# Patient Record
Sex: FEMALE | Race: BLACK OR AFRICAN AMERICAN | NOT HISPANIC OR LATINO | Employment: UNEMPLOYED | URBAN - METROPOLITAN AREA
[De-identification: names, ages, dates, MRNs, and addresses within clinical notes are randomized per-mention and may not be internally consistent; named-entity substitution may affect disease eponyms.]

---

## 2023-02-22 LAB
ANION GAP IN SER/PLAS: 13 MMOL/L (ref 10–20)
CALCIUM (MG/DL) IN SER/PLAS: 9.3 MG/DL (ref 8.6–10.6)
CARBON DIOXIDE, TOTAL (MMOL/L) IN SER/PLAS: 29 MMOL/L (ref 21–32)
CHLORIDE (MMOL/L) IN SER/PLAS: 101 MMOL/L (ref 98–107)
CHOLESTEROL (MG/DL) IN SER/PLAS: 181 MG/DL (ref 0–199)
CHOLESTEROL IN HDL (MG/DL) IN SER/PLAS: 37.7 MG/DL
CHOLESTEROL/HDL RATIO: 4.8
CREATININE (MG/DL) IN SER/PLAS: 0.51 MG/DL (ref 0.5–1.05)
ESTIMATED AVERAGE GLUCOSE FOR HBA1C: 126 MG/DL
GFR FEMALE: >90 ML/MIN/1.73M2
GLUCOSE (MG/DL) IN SER/PLAS: 133 MG/DL (ref 74–99)
HEMOGLOBIN A1C/HEMOGLOBIN TOTAL IN BLOOD: 6 %
LDL: 126 MG/DL (ref 0–99)
POTASSIUM (MMOL/L) IN SER/PLAS: 4 MMOL/L (ref 3.5–5.3)
SODIUM (MMOL/L) IN SER/PLAS: 139 MMOL/L (ref 136–145)
TRIGLYCERIDE (MG/DL) IN SER/PLAS: 88 MG/DL (ref 0–149)
UREA NITROGEN (MG/DL) IN SER/PLAS: 8 MG/DL (ref 6–23)
VLDL: 18 MG/DL (ref 0–40)

## 2023-02-23 LAB
ALBUMIN (MG/L) IN URINE: 110.4 MG/L
ALBUMIN/CREATININE (UG/MG) IN URINE: 101.3 UG/MG CRT (ref 0–30)
CREATININE (MG/DL) IN URINE: 109 MG/DL (ref 20–320)

## 2023-03-03 LAB
BASOPHILS (10*3/UL) IN BLOOD BY AUTOMATED COUNT: 0.02 X10E9/L (ref 0–0.1)
BASOPHILS/100 LEUKOCYTES IN BLOOD BY AUTOMATED COUNT: 0.4 % (ref 0–2)
EOSINOPHILS (10*3/UL) IN BLOOD BY AUTOMATED COUNT: 0.1 X10E9/L (ref 0–0.7)
EOSINOPHILS/100 LEUKOCYTES IN BLOOD BY AUTOMATED COUNT: 1.8 % (ref 0–6)
ERYTHROCYTE DISTRIBUTION WIDTH (RATIO) BY AUTOMATED COUNT: 12.5 % (ref 11.5–14.5)
ERYTHROCYTE MEAN CORPUSCULAR HEMOGLOBIN CONCENTRATION (G/DL) BY AUTOMATED: 32.5 G/DL (ref 32–36)
ERYTHROCYTE MEAN CORPUSCULAR VOLUME (FL) BY AUTOMATED COUNT: 84 FL (ref 80–100)
ERYTHROCYTES (10*6/UL) IN BLOOD BY AUTOMATED COUNT: 4.53 X10E12/L (ref 4–5.2)
HEMATOCRIT (%) IN BLOOD BY AUTOMATED COUNT: 37.9 % (ref 36–46)
HEMOGLOBIN (G/DL) IN BLOOD: 12.3 G/DL (ref 12–16)
IMMATURE GRANULOCYTES/100 LEUKOCYTES IN BLOOD BY AUTOMATED COUNT: 0.5 % (ref 0–0.9)
LEUKOCYTES (10*3/UL) IN BLOOD BY AUTOMATED COUNT: 5.5 X10E9/L (ref 4.4–11.3)
LYMPHOCYTES (10*3/UL) IN BLOOD BY AUTOMATED COUNT: 1.56 X10E9/L (ref 1.2–4.8)
LYMPHOCYTES/100 LEUKOCYTES IN BLOOD BY AUTOMATED COUNT: 28.3 % (ref 13–44)
MONOCYTES (10*3/UL) IN BLOOD BY AUTOMATED COUNT: 0.52 X10E9/L (ref 0.1–1)
MONOCYTES/100 LEUKOCYTES IN BLOOD BY AUTOMATED COUNT: 9.4 % (ref 2–10)
NEUTROPHILS (10*3/UL) IN BLOOD BY AUTOMATED COUNT: 3.29 X10E9/L (ref 1.2–7.7)
NEUTROPHILS/100 LEUKOCYTES IN BLOOD BY AUTOMATED COUNT: 59.6 % (ref 40–80)
NRBC (PER 100 WBCS) BY AUTOMATED COUNT: 0 /100 WBC (ref 0–0)
PLATELETS (10*3/UL) IN BLOOD AUTOMATED COUNT: 320 X10E9/L (ref 150–450)

## 2023-03-12 DIAGNOSIS — R92.8 ABNORMAL MAMMOGRAM: Primary | ICD-10-CM

## 2023-03-13 ENCOUNTER — TELEPHONE (OUTPATIENT)
Dept: PRIMARY CARE | Facility: CLINIC | Age: 63
End: 2023-03-13

## 2023-03-13 NOTE — TELEPHONE ENCOUNTER
----- Message from Katlin Calloway Pla, DO sent at 3/12/2023  4:54 PM EDT -----  Notify pt her diagnostic mammogram and US are showing probably benign masses but also maybe a hematoma.  Did she bump her breast recemtly?  They are recommending a repeat mammogram and US in 4-6 weeks.   I will put the order on her chart

## 2023-04-10 ENCOUNTER — PATIENT OUTREACH (OUTPATIENT)
Dept: CARE COORDINATION | Facility: CLINIC | Age: 63
End: 2023-04-10
Payer: MEDICARE

## 2023-04-10 RX ORDER — SULFAMETHOXAZOLE AND TRIMETHOPRIM 800; 160 MG/1; MG/1
1 TABLET ORAL 3 TIMES WEEKLY
COMMUNITY
Start: 2023-04-07 | End: 2023-06-30 | Stop reason: ALTCHOICE

## 2023-04-10 RX ORDER — PREDNISONE 10 MG/1
10 TABLET ORAL DAILY
COMMUNITY
Start: 2023-04-07 | End: 2023-06-30 | Stop reason: ALTCHOICE

## 2023-04-10 RX ORDER — PANTOPRAZOLE SODIUM 40 MG/1
1 TABLET, DELAYED RELEASE ORAL DAILY
COMMUNITY
Start: 2023-04-07 | End: 2023-06-30 | Stop reason: ALTCHOICE

## 2023-04-10 NOTE — PROGRESS NOTES
Discharge Facility: [facility]  Discharge Diagnosis: Optic Neuritis  Admission Date: 4/4/23  Discharge Date: 4/7/23    No PCP appointments available within 14 days of discharge  Message sent to practice clerical pool to reach out to patient and schedule an appointment within 7-13 days from discharge date.       Engagement  Call Start Time: 1011 (4/10/2023 10:12 AM)    Medications  Medications reviewed with patient/caregiver?: Yes (4/10/2023 10:12 AM)  Is the patient having any side effects they believe may be caused by any medication additions or changes?: (!) Yes (high blood sugars related to steroids, very poor energy levels) (4/10/2023 10:12 AM)  Does the patient have all medications ordered at discharge?: Yes (4/10/2023 10:12 AM)  Is the patient taking all medications as directed (includes completed medication regime)?: Yes (4/10/2023 10:12 AM)    Appointments  Does the patient have a primary care provider?: Yes (4/10/2023 10:12 AM)  Care Management Interventions: Educated patient on importance of making appointment; Advised patient to make appointment (4/10/2023 10:12 AM)  Care Management Interventions: Educated on importance of keeping appointment (neuroopthalmalogy and neuromuscular appts scheduled.) (4/10/2023 10:12 AM)  Follow Up Tasks: PCP needed (4/10/2023 10:12 AM)    Self Management  What is the home health agency?: N/A (4/10/2023 10:12 AM)  What Durable Medical Equipment (DME) was ordered?: N/A (4/10/2023 10:12 AM)    Patient Teaching  Does the patient have access to their discharge instructions?: Yes (4/10/2023 10:12 AM)  Care Management Interventions: Reviewed instructions with patient (4/10/2023 10:12 AM)  What is the patient's perception of their health status since discharge?: Same (patient expresses weakness/poor energy along with high blood sugars since discharge. states she was on insulin sliding scale in the hospital (related to being on steriods) and she is concerned it is  not being well controlled at home on her normal meds.) (4/10/2023 10:12 AM)  Is the patient/caregiver able to teach back the hierarchy of who to call/visit for symptoms/problems? PCP, Specialist, Home Health nurse, Urgent Care, ED, 911: Yes (4/10/2023 10:12 AM)  Patient/Caregiver Education Comments: pt was already planning to contact endocrinology MD Curtis Kim office to discuss high blood sugars. (4/10/2023 10:12 AM)

## 2023-04-17 PROBLEM — H52.4 MYOPIA OF BOTH EYES WITH ASTIGMATISM AND PRESBYOPIA: Status: ACTIVE | Noted: 2023-04-17

## 2023-04-17 PROBLEM — H25.12 AGE-RELATED NUCLEAR CATARACT, LEFT: Status: ACTIVE | Noted: 2023-04-17

## 2023-04-17 PROBLEM — R92.8 ABNORMAL MAMMOGRAM: Status: ACTIVE | Noted: 2023-04-17

## 2023-04-17 PROBLEM — E04.1 LEFT THYROID NODULE: Status: ACTIVE | Noted: 2023-04-17

## 2023-04-17 PROBLEM — H25.813 COMBINED FORM OF AGE-RELATED CATARACT, BOTH EYES: Status: ACTIVE | Noted: 2023-04-17

## 2023-04-17 PROBLEM — E11.9 TYPE 2 DIABETES MELLITUS (MULTI): Status: ACTIVE | Noted: 2023-04-17

## 2023-04-17 PROBLEM — H69.90 DYSFUNCTION OF EUSTACHIAN TUBE: Status: ACTIVE | Noted: 2023-04-17

## 2023-04-17 PROBLEM — I10 ESSENTIAL HYPERTENSION: Status: ACTIVE | Noted: 2023-04-17

## 2023-04-17 PROBLEM — H52.203 MYOPIA OF BOTH EYES WITH ASTIGMATISM AND PRESBYOPIA: Status: ACTIVE | Noted: 2023-04-17

## 2023-04-17 PROBLEM — H25.11 AGE-RELATED NUCLEAR CATARACT, RIGHT: Status: ACTIVE | Noted: 2023-04-17

## 2023-04-17 PROBLEM — F32.0 DEPRESSION, MAJOR, SINGLE EPISODE, MILD (CMS-HCC): Status: ACTIVE | Noted: 2023-04-17

## 2023-04-17 PROBLEM — H04.123 DRY EYES, BILATERAL: Status: ACTIVE | Noted: 2023-04-17

## 2023-04-17 PROBLEM — E78.5 DYSLIPIDEMIA: Status: ACTIVE | Noted: 2023-04-17

## 2023-04-17 PROBLEM — F41.9 ANXIETY: Status: ACTIVE | Noted: 2023-04-17

## 2023-04-17 PROBLEM — Z79.624 ENCOUNTER FOR LONG TERM CURRENT AZATHIOPRINE THERAPY: Status: ACTIVE | Noted: 2023-04-17

## 2023-04-17 PROBLEM — F32.A DEPRESSION: Status: ACTIVE | Noted: 2023-04-17

## 2023-04-17 PROBLEM — G62.9 NEUROPATHY: Status: ACTIVE | Noted: 2023-04-17

## 2023-04-17 PROBLEM — G43.909 MIGRAINES: Status: ACTIVE | Noted: 2023-04-17

## 2023-04-17 PROBLEM — R51.9 DAILY HEADACHE: Status: ACTIVE | Noted: 2023-04-17

## 2023-04-17 PROBLEM — G47.00 INSOMNIA: Status: ACTIVE | Noted: 2023-04-17

## 2023-04-17 PROBLEM — H52.13 MYOPIA OF BOTH EYES WITH ASTIGMATISM AND PRESBYOPIA: Status: ACTIVE | Noted: 2023-04-17

## 2023-04-17 PROBLEM — H90.3 ASYMMETRIC SNHL (SENSORINEURAL HEARING LOSS): Status: ACTIVE | Noted: 2023-04-17

## 2023-04-17 PROBLEM — H90.0 CONDUCTIVE HEARING LOSS, BILATERAL: Status: ACTIVE | Noted: 2023-04-17

## 2023-04-17 PROBLEM — G36.0 NEUROMYELITIS OPTICA (MULTI): Status: ACTIVE | Noted: 2023-04-17

## 2023-04-20 ENCOUNTER — PATIENT OUTREACH (OUTPATIENT)
Dept: CARE COORDINATION | Facility: CLINIC | Age: 63
End: 2023-04-20
Payer: MEDICARE

## 2023-04-20 NOTE — PROGRESS NOTES
Unable to reach patient for call back. Patient has not had a PCP follow up visit.  LVM with call back number for patient to call if needed.

## 2023-06-21 LAB
ALANINE AMINOTRANSFERASE (SGPT) (U/L) IN SER/PLAS: 13 U/L (ref 7–45)
ALBUMIN (G/DL) IN SER/PLAS: 4.5 G/DL (ref 3.4–5)
ALKALINE PHOSPHATASE (U/L) IN SER/PLAS: 75 U/L (ref 33–136)
ANION GAP IN SER/PLAS: 14 MMOL/L (ref 10–20)
ASPARTATE AMINOTRANSFERASE (SGOT) (U/L) IN SER/PLAS: 13 U/L (ref 9–39)
BASOPHILS (10*3/UL) IN BLOOD BY AUTOMATED COUNT: 0.03 X10E9/L (ref 0–0.1)
BASOPHILS/100 LEUKOCYTES IN BLOOD BY AUTOMATED COUNT: 0.5 % (ref 0–2)
BILIRUBIN TOTAL (MG/DL) IN SER/PLAS: 0.5 MG/DL (ref 0–1.2)
CALCIUM (MG/DL) IN SER/PLAS: 9.8 MG/DL (ref 8.6–10.6)
CARBON DIOXIDE, TOTAL (MMOL/L) IN SER/PLAS: 27 MMOL/L (ref 21–32)
CHLORIDE (MMOL/L) IN SER/PLAS: 100 MMOL/L (ref 98–107)
CREATININE (MG/DL) IN SER/PLAS: 0.62 MG/DL (ref 0.5–1.05)
EOSINOPHILS (10*3/UL) IN BLOOD BY AUTOMATED COUNT: 0.1 X10E9/L (ref 0–0.7)
EOSINOPHILS/100 LEUKOCYTES IN BLOOD BY AUTOMATED COUNT: 1.8 % (ref 0–6)
ERYTHROCYTE DISTRIBUTION WIDTH (RATIO) BY AUTOMATED COUNT: 14.1 % (ref 11.5–14.5)
ERYTHROCYTE MEAN CORPUSCULAR HEMOGLOBIN CONCENTRATION (G/DL) BY AUTOMATED: 32.1 G/DL (ref 32–36)
ERYTHROCYTE MEAN CORPUSCULAR VOLUME (FL) BY AUTOMATED COUNT: 83 FL (ref 80–100)
ERYTHROCYTES (10*6/UL) IN BLOOD BY AUTOMATED COUNT: 4.67 X10E12/L (ref 4–5.2)
GFR FEMALE: >90 ML/MIN/1.73M2
GLUCOSE (MG/DL) IN SER/PLAS: 105 MG/DL (ref 74–99)
HEMATOCRIT (%) IN BLOOD BY AUTOMATED COUNT: 38.9 % (ref 36–46)
HEMOGLOBIN (G/DL) IN BLOOD: 12.5 G/DL (ref 12–16)
IMMATURE GRANULOCYTES/100 LEUKOCYTES IN BLOOD BY AUTOMATED COUNT: 0.7 % (ref 0–0.9)
LEUKOCYTES (10*3/UL) IN BLOOD BY AUTOMATED COUNT: 5.7 X10E9/L (ref 4.4–11.3)
LYMPHOCYTES (10*3/UL) IN BLOOD BY AUTOMATED COUNT: 1.39 X10E9/L (ref 1.2–4.8)
LYMPHOCYTES/100 LEUKOCYTES IN BLOOD BY AUTOMATED COUNT: 24.3 % (ref 13–44)
MONOCYTES (10*3/UL) IN BLOOD BY AUTOMATED COUNT: 0.45 X10E9/L (ref 0.1–1)
MONOCYTES/100 LEUKOCYTES IN BLOOD BY AUTOMATED COUNT: 7.9 % (ref 2–10)
NEUTROPHILS (10*3/UL) IN BLOOD BY AUTOMATED COUNT: 3.7 X10E9/L (ref 1.2–7.7)
NEUTROPHILS/100 LEUKOCYTES IN BLOOD BY AUTOMATED COUNT: 64.8 % (ref 40–80)
NRBC (PER 100 WBCS) BY AUTOMATED COUNT: 0 /100 WBC (ref 0–0)
PLATELETS (10*3/UL) IN BLOOD AUTOMATED COUNT: 312 X10E9/L (ref 150–450)
POTASSIUM (MMOL/L) IN SER/PLAS: 3.9 MMOL/L (ref 3.5–5.3)
PROTEIN TOTAL: 7 G/DL (ref 6.4–8.2)
SODIUM (MMOL/L) IN SER/PLAS: 137 MMOL/L (ref 136–145)
UREA NITROGEN (MG/DL) IN SER/PLAS: 8 MG/DL (ref 6–23)

## 2023-06-26 ENCOUNTER — TELEPHONE (OUTPATIENT)
Dept: PRIMARY CARE | Facility: CLINIC | Age: 63
End: 2023-06-26
Payer: MEDICARE

## 2023-06-26 DIAGNOSIS — G62.9 NEUROPATHY: Primary | ICD-10-CM

## 2023-06-27 LAB — MOG FACS, S: NEGATIVE

## 2023-06-28 DIAGNOSIS — F32.A DEPRESSION, UNSPECIFIED: ICD-10-CM

## 2023-06-28 DIAGNOSIS — F41.9 ANXIETY DISORDER, UNSPECIFIED: ICD-10-CM

## 2023-06-28 RX ORDER — BUPROPION HYDROCHLORIDE 75 MG/1
TABLET ORAL
Qty: 90 TABLET | Refills: 0 | Status: SHIPPED | OUTPATIENT
Start: 2023-06-28 | End: 2023-06-30 | Stop reason: SDUPTHER

## 2023-06-28 RX ORDER — ESCITALOPRAM OXALATE 20 MG/1
TABLET ORAL
Qty: 90 TABLET | Refills: 0 | Status: SHIPPED | OUTPATIENT
Start: 2023-06-28 | End: 2024-01-12 | Stop reason: SDUPTHER

## 2023-06-29 ENCOUNTER — LAB (OUTPATIENT)
Dept: LAB | Facility: LAB | Age: 63
End: 2023-06-29
Payer: MEDICARE

## 2023-06-29 DIAGNOSIS — G62.9 NEUROPATHY: ICD-10-CM

## 2023-06-29 LAB
CALCIDIOL (25 OH VITAMIN D3) (NG/ML) IN SER/PLAS: 48 NG/ML
COBALAMIN (VITAMIN B12) (PG/ML) IN SER/PLAS: 403 PG/ML (ref 211–911)

## 2023-06-29 PROCEDURE — 82607 VITAMIN B-12: CPT

## 2023-06-29 PROCEDURE — 82306 VITAMIN D 25 HYDROXY: CPT

## 2023-06-29 PROCEDURE — 36415 COLL VENOUS BLD VENIPUNCTURE: CPT

## 2023-06-30 ENCOUNTER — OFFICE VISIT (OUTPATIENT)
Dept: PRIMARY CARE | Facility: CLINIC | Age: 63
End: 2023-06-30
Payer: MEDICARE

## 2023-06-30 VITALS
DIASTOLIC BLOOD PRESSURE: 84 MMHG | OXYGEN SATURATION: 96 % | RESPIRATION RATE: 16 BRPM | HEART RATE: 74 BPM | WEIGHT: 188.2 LBS | BODY MASS INDEX: 31.32 KG/M2 | TEMPERATURE: 98.4 F | SYSTOLIC BLOOD PRESSURE: 130 MMHG

## 2023-06-30 DIAGNOSIS — I10 ESSENTIAL HYPERTENSION: Primary | ICD-10-CM

## 2023-06-30 DIAGNOSIS — E78.5 DYSLIPIDEMIA: ICD-10-CM

## 2023-06-30 DIAGNOSIS — F41.9 ANXIETY: ICD-10-CM

## 2023-06-30 DIAGNOSIS — F32.0 DEPRESSION, MAJOR, SINGLE EPISODE, MILD (CMS-HCC): ICD-10-CM

## 2023-06-30 DIAGNOSIS — E66.09 CLASS 1 OBESITY DUE TO EXCESS CALORIES WITH SERIOUS COMORBIDITY AND BODY MASS INDEX (BMI) OF 31.0 TO 31.9 IN ADULT: ICD-10-CM

## 2023-06-30 DIAGNOSIS — G43.809 OTHER MIGRAINE WITHOUT STATUS MIGRAINOSUS, NOT INTRACTABLE: ICD-10-CM

## 2023-06-30 DIAGNOSIS — G36.0 NEUROMYELITIS OPTICA (MULTI): ICD-10-CM

## 2023-06-30 DIAGNOSIS — F41.9 ANXIETY DISORDER, UNSPECIFIED: ICD-10-CM

## 2023-06-30 DIAGNOSIS — E11.69 TYPE 2 DIABETES MELLITUS WITH OTHER SPECIFIED COMPLICATION, WITHOUT LONG-TERM CURRENT USE OF INSULIN (MULTI): ICD-10-CM

## 2023-06-30 PROBLEM — H69.90 DYSFUNCTION OF EUSTACHIAN TUBE: Status: RESOLVED | Noted: 2023-04-17 | Resolved: 2023-06-30

## 2023-06-30 PROBLEM — F32.A DEPRESSION: Status: RESOLVED | Noted: 2023-04-17 | Resolved: 2023-06-30

## 2023-06-30 PROBLEM — R51.9 DAILY HEADACHE: Status: RESOLVED | Noted: 2023-04-17 | Resolved: 2023-06-30

## 2023-06-30 PROBLEM — R92.8 ABNORMAL MAMMOGRAM: Status: RESOLVED | Noted: 2023-04-17 | Resolved: 2023-06-30

## 2023-06-30 PROBLEM — G62.9 NEUROPATHY: Status: RESOLVED | Noted: 2023-04-17 | Resolved: 2023-06-30

## 2023-06-30 PROBLEM — E66.811 CLASS 1 OBESITY DUE TO EXCESS CALORIES WITH SERIOUS COMORBIDITY AND BODY MASS INDEX (BMI) OF 31.0 TO 31.9 IN ADULT: Status: ACTIVE | Noted: 2023-06-30

## 2023-06-30 PROBLEM — H04.123 DRY EYES, BILATERAL: Status: RESOLVED | Noted: 2023-04-17 | Resolved: 2023-06-30

## 2023-06-30 LAB
Lab: ABNORMAL TITER
NMO/AQP4 FACS, S: POSITIVE

## 2023-06-30 PROCEDURE — 1036F TOBACCO NON-USER: CPT | Performed by: FAMILY MEDICINE

## 2023-06-30 PROCEDURE — 99214 OFFICE O/P EST MOD 30 MIN: CPT | Performed by: FAMILY MEDICINE

## 2023-06-30 PROCEDURE — 3079F DIAST BP 80-89 MM HG: CPT | Performed by: FAMILY MEDICINE

## 2023-06-30 PROCEDURE — 3075F SYST BP GE 130 - 139MM HG: CPT | Performed by: FAMILY MEDICINE

## 2023-06-30 PROCEDURE — 3044F HG A1C LEVEL LT 7.0%: CPT | Performed by: FAMILY MEDICINE

## 2023-06-30 PROCEDURE — 3008F BODY MASS INDEX DOCD: CPT | Performed by: FAMILY MEDICINE

## 2023-06-30 RX ORDER — BUPROPION HYDROCHLORIDE 75 MG/1
75 TABLET ORAL DAILY
Qty: 90 TABLET | Refills: 0 | Status: SHIPPED | OUTPATIENT
Start: 2023-06-30 | End: 2023-09-26

## 2023-06-30 RX ORDER — HYDROCHLOROTHIAZIDE 25 MG/1
25 TABLET ORAL DAILY
Qty: 90 TABLET | Refills: 1 | Status: SHIPPED | OUTPATIENT
Start: 2023-06-30 | End: 2024-01-01

## 2023-06-30 ASSESSMENT — PATIENT HEALTH QUESTIONNAIRE - PHQ9
1. LITTLE INTEREST OR PLEASURE IN DOING THINGS: NOT AT ALL
2. FEELING DOWN, DEPRESSED OR HOPELESS: NOT AT ALL
SUM OF ALL RESPONSES TO PHQ9 QUESTIONS 1 AND 2: 0

## 2023-06-30 NOTE — PROGRESS NOTES
Subjective   Patient ID: Celeste Neely is a 62 y.o. female who presents for Med Refill.    Here for med check and refills.     Taking meds daily for HTN, anxiety and DM.  Was hospitalized for a flare of optic neuritis on the left  Seeing neuro-ophthalmology  MRI positive for lesion on the optic nerve  High dose steroids caused high sugars  Started on Mounjaro per endo  Doing great on the meds  Suppressing her appetite    Weight down 5 lbs  Has been on meds for the past 2 months  Stopped all other Dm meds    Seeing endo regular for DM  A1C level was bad at 7.8  Blood sugars are getting better.     Denies chest pain, shortness of breath, lightheaded, dizziness or headaches.     Med Refill       Review of Systems    Objective   /84   Pulse 74   Temp 36.9 °C (98.4 °F)   Resp 16   Wt 85.4 kg (188 lb 3.2 oz)   SpO2 96%   BMI 31.32 kg/m²     Physical Exam  Vitals and nursing note reviewed.   Constitutional:       Appearance: Normal appearance.   Cardiovascular:      Rate and Rhythm: Normal rate and regular rhythm.   Pulmonary:      Effort: Pulmonary effort is normal.      Breath sounds: Normal breath sounds.   Musculoskeletal:      Cervical back: Normal range of motion.   Neurological:      Mental Status: She is alert.   Psychiatric:         Mood and Affect: Mood normal.         Behavior: Behavior normal.         Thought Content: Thought content normal.         Judgment: Judgment normal.       Assessment/Plan   Problem List Items Addressed This Visit       Anxiety     Improving with additional bupropion.  Refilling meds at same dose.  Recheck in 3 months         Relevant Orders    TSH with reflex to Free T4 if abnormal    Depression, major, single episode, mild (CMS/HCC)     Improving with additional meds.  Refilling at same dose.  Recheck in 3 months         Dyslipidemia     Checking fasting labs and treat accordingly         Relevant Orders    Comprehensive Metabolic Panel    Lipid Panel    Essential  hypertension - Primary     Stable  Refilling meds at same dose.  Reviewed previous labs.  Congratulations on weight loss efforts.  Recheck in 3 months         Relevant Medications    hydroCHLOROthiazide (HYDRODiuril) 25 mg tablet    Other Relevant Orders    CBC and Auto Differential    Migraines    Neuromyelitis optica (CMS/HCC)     Stable  Continue care per neuro and neuro ophthalmology         Type 2 diabetes mellitus (CMS/[facility])     Improving  Continue care per Endo         Relevant Orders    Hemoglobin A1C    Class 1 obesity due to excess calories with serious comorbidity and body mass index (BMI) of 31.0 to 31.9 in adult     Improving with Mounjaro  Continue present meds.  Recheck in 3 months          Other Visit Diagnoses       Anxiety disorder, unspecified        Relevant Medications    buPROPion (Wellbutrin) 75 mg tablet

## 2023-06-30 NOTE — ASSESSMENT & PLAN NOTE
Stable  Refilling meds at same dose.  Reviewed previous labs.  Congratulations on weight loss efforts.  Recheck in 3 months

## 2023-08-16 LAB
ALANINE AMINOTRANSFERASE (SGPT) (U/L) IN SER/PLAS: 16 U/L (ref 7–45)
ALBUMIN (G/DL) IN SER/PLAS: 4.4 G/DL (ref 3.4–5)
ALKALINE PHOSPHATASE (U/L) IN SER/PLAS: 73 U/L (ref 33–136)
ANION GAP IN SER/PLAS: 15 MMOL/L (ref 10–20)
ASPARTATE AMINOTRANSFERASE (SGOT) (U/L) IN SER/PLAS: 16 U/L (ref 9–39)
BASOPHILS (10*3/UL) IN BLOOD BY AUTOMATED COUNT: 0.02 X10E9/L (ref 0–0.1)
BASOPHILS/100 LEUKOCYTES IN BLOOD BY AUTOMATED COUNT: 0.3 % (ref 0–2)
BILIRUBIN TOTAL (MG/DL) IN SER/PLAS: 0.6 MG/DL (ref 0–1.2)
CALCIUM (MG/DL) IN SER/PLAS: 9.6 MG/DL (ref 8.6–10.6)
CARBON DIOXIDE, TOTAL (MMOL/L) IN SER/PLAS: 29 MMOL/L (ref 21–32)
CHLORIDE (MMOL/L) IN SER/PLAS: 99 MMOL/L (ref 98–107)
CREATININE (MG/DL) IN SER/PLAS: 0.5 MG/DL (ref 0.5–1.05)
EOSINOPHILS (10*3/UL) IN BLOOD BY AUTOMATED COUNT: 0.14 X10E9/L (ref 0–0.7)
EOSINOPHILS/100 LEUKOCYTES IN BLOOD BY AUTOMATED COUNT: 2.4 % (ref 0–6)
ERYTHROCYTE DISTRIBUTION WIDTH (RATIO) BY AUTOMATED COUNT: 12.4 % (ref 11.5–14.5)
ERYTHROCYTE MEAN CORPUSCULAR HEMOGLOBIN CONCENTRATION (G/DL) BY AUTOMATED: 31.1 G/DL (ref 32–36)
ERYTHROCYTE MEAN CORPUSCULAR VOLUME (FL) BY AUTOMATED COUNT: 86 FL (ref 80–100)
ERYTHROCYTES (10*6/UL) IN BLOOD BY AUTOMATED COUNT: 4.77 X10E12/L (ref 4–5.2)
GFR FEMALE: >90 ML/MIN/1.73M2
GLUCOSE (MG/DL) IN SER/PLAS: 140 MG/DL (ref 74–99)
HEMATOCRIT (%) IN BLOOD BY AUTOMATED COUNT: 40.9 % (ref 36–46)
HEMOGLOBIN (G/DL) IN BLOOD: 12.7 G/DL (ref 12–16)
IMMATURE GRANULOCYTES/100 LEUKOCYTES IN BLOOD BY AUTOMATED COUNT: 0.3 % (ref 0–0.9)
LEUKOCYTES (10*3/UL) IN BLOOD BY AUTOMATED COUNT: 5.8 X10E9/L (ref 4.4–11.3)
LYMPHOCYTES (10*3/UL) IN BLOOD BY AUTOMATED COUNT: 1.37 X10E9/L (ref 1.2–4.8)
LYMPHOCYTES/100 LEUKOCYTES IN BLOOD BY AUTOMATED COUNT: 23.8 % (ref 13–44)
MONOCYTES (10*3/UL) IN BLOOD BY AUTOMATED COUNT: 0.43 X10E9/L (ref 0.1–1)
MONOCYTES/100 LEUKOCYTES IN BLOOD BY AUTOMATED COUNT: 7.5 % (ref 2–10)
NEUTROPHILS (10*3/UL) IN BLOOD BY AUTOMATED COUNT: 3.77 X10E9/L (ref 1.2–7.7)
NEUTROPHILS/100 LEUKOCYTES IN BLOOD BY AUTOMATED COUNT: 65.7 % (ref 40–80)
NRBC (PER 100 WBCS) BY AUTOMATED COUNT: 0 /100 WBC (ref 0–0)
PLATELETS (10*3/UL) IN BLOOD AUTOMATED COUNT: 395 X10E9/L (ref 150–450)
POTASSIUM (MMOL/L) IN SER/PLAS: 3.8 MMOL/L (ref 3.5–5.3)
PROTEIN TOTAL: 6.8 G/DL (ref 6.4–8.2)
SODIUM (MMOL/L) IN SER/PLAS: 139 MMOL/L (ref 136–145)
UREA NITROGEN (MG/DL) IN SER/PLAS: 6 MG/DL (ref 6–23)

## 2023-08-29 ENCOUNTER — TELEPHONE (OUTPATIENT)
Dept: PRIMARY CARE | Facility: CLINIC | Age: 63
End: 2023-08-29
Payer: MEDICARE

## 2023-08-29 DIAGNOSIS — M79.644 CHRONIC THUMB PAIN, BILATERAL: Primary | ICD-10-CM

## 2023-08-29 DIAGNOSIS — G89.29 CHRONIC THUMB PAIN, BILATERAL: Primary | ICD-10-CM

## 2023-08-29 DIAGNOSIS — M79.645 CHRONIC THUMB PAIN, BILATERAL: Primary | ICD-10-CM

## 2023-08-29 NOTE — TELEPHONE ENCOUNTER
Patient requesting OT for bilateral thumb pain(dry needling and kinesiotaping). States went to [facility] for cortisone injection but pt states it did not help. Please advise    [facility]- [phone]

## 2023-09-25 ENCOUNTER — OFFICE VISIT (OUTPATIENT)
Dept: PRIMARY CARE | Facility: CLINIC | Age: 63
End: 2023-09-25
Payer: MEDICARE

## 2023-09-25 VITALS
RESPIRATION RATE: 16 BRPM | BODY MASS INDEX: 29.59 KG/M2 | WEIGHT: 177.8 LBS | SYSTOLIC BLOOD PRESSURE: 130 MMHG | DIASTOLIC BLOOD PRESSURE: 76 MMHG | OXYGEN SATURATION: 98 % | HEART RATE: 63 BPM | TEMPERATURE: 97.8 F

## 2023-09-25 DIAGNOSIS — E11.69 TYPE 2 DIABETES MELLITUS WITH OTHER SPECIFIED COMPLICATION, WITHOUT LONG-TERM CURRENT USE OF INSULIN (MULTI): ICD-10-CM

## 2023-09-25 DIAGNOSIS — I10 ESSENTIAL HYPERTENSION: Primary | ICD-10-CM

## 2023-09-25 DIAGNOSIS — Z12.11 COLON CANCER SCREENING: ICD-10-CM

## 2023-09-25 DIAGNOSIS — F32.0 DEPRESSION, MAJOR, SINGLE EPISODE, MILD (CMS-HCC): ICD-10-CM

## 2023-09-25 DIAGNOSIS — E78.5 DYSLIPIDEMIA: ICD-10-CM

## 2023-09-25 DIAGNOSIS — G36.0 NEUROMYELITIS OPTICA (MULTI): ICD-10-CM

## 2023-09-25 DIAGNOSIS — E66.09 CLASS 1 OBESITY DUE TO EXCESS CALORIES WITH SERIOUS COMORBIDITY AND BODY MASS INDEX (BMI) OF 31.0 TO 31.9 IN ADULT: ICD-10-CM

## 2023-09-25 PROCEDURE — 1036F TOBACCO NON-USER: CPT | Performed by: FAMILY MEDICINE

## 2023-09-25 PROCEDURE — 99213 OFFICE O/P EST LOW 20 MIN: CPT | Performed by: FAMILY MEDICINE

## 2023-09-25 PROCEDURE — 3008F BODY MASS INDEX DOCD: CPT | Performed by: FAMILY MEDICINE

## 2023-09-25 PROCEDURE — 3075F SYST BP GE 130 - 139MM HG: CPT | Performed by: FAMILY MEDICINE

## 2023-09-25 PROCEDURE — 3078F DIAST BP <80 MM HG: CPT | Performed by: FAMILY MEDICINE

## 2023-09-25 PROCEDURE — 3044F HG A1C LEVEL LT 7.0%: CPT | Performed by: FAMILY MEDICINE

## 2023-09-25 RX ORDER — AMLODIPINE BESYLATE 10 MG/1
10 TABLET ORAL DAILY
Qty: 90 TABLET | Refills: 1 | Status: SHIPPED | OUTPATIENT
Start: 2023-09-25 | End: 2024-01-12 | Stop reason: SDUPTHER

## 2023-09-25 NOTE — PROGRESS NOTES
Subjective   Patient ID: Celeste Neely is a 63 y.o. female who presents for Follow-up (3 mth med/ BP check).    Here for med check and refills    Taking meds daily for hypertension  Anxiety/depression and neuromyelitis optica    Weight down about 10 lbs from last visit.   Weight down about 16 lbs    Taking mounjaro for weight and DM  Could not tolerate the 5mg dose  Still taking 2.5 mg  Appetite still suppressed  Has to force her to eat.    Did see derm for hair loss  Taking rogaine for the past 2 weeks.   Taking pre- vits, 1/2 tab daily  Increased protein in diet.   Vegetarian eating fish only, no land animals.   Cholesterol issues and not wanting to take statins.  Denies chest pain, shortness of breath, lightheaded, dizziness or headaches.     Home BP checks are better  Has not been taking her hydrochlorothiazide  Seeing endo next month             Review of Systems    Objective   /76   Pulse 63   Temp 36.6 °C (97.8 °F)   Resp 16   Wt 80.6 kg (177 lb 12.8 oz)   SpO2 98%   BMI 29.59 kg/m²     Physical Exam  Vitals and nursing note reviewed.   Constitutional:       Appearance: Normal appearance.   Cardiovascular:      Rate and Rhythm: Normal rate and regular rhythm.   Pulmonary:      Effort: Pulmonary effort is normal.      Breath sounds: Normal breath sounds.   Musculoskeletal:      Cervical back: Normal range of motion.   Neurological:      Mental Status: She is alert.   Psychiatric:         Mood and Affect: Mood normal.         Behavior: Behavior normal.         Thought Content: Thought content normal.         Judgment: Judgment normal.       Assessment/Plan   Problem List Items Addressed This Visit       Depression, major, single episode, mild (CMS/HCC)     Stable  Continue present meds         Dyslipidemia    Essential hypertension - Primary     Stable  Reviewed all labs.  Refilling meds at same dose.  Recheck in 3 months         Relevant Medications    amLODIPine (Norvasc) 10 mg tablet     Neuromyelitis optica (CMS/HCC)     Stable  Continue care per specialist         Type 2 diabetes mellitus (CMS/HCC)     Improving with GLP-1.  Continue care per Endo         Class 1 obesity due to excess calories with serious comorbidity and body mass index (BMI) of 31.0 to 31.9 in adult     Improving with use of GLP-1.  Continue meds at same dose.  Recheck in 3 months          Other Visit Diagnoses       Colon cancer screening        Relevant Orders    Colonoscopy Screening

## 2023-09-26 DIAGNOSIS — F41.9 ANXIETY DISORDER, UNSPECIFIED: ICD-10-CM

## 2023-09-26 RX ORDER — BUPROPION HYDROCHLORIDE 75 MG/1
75 TABLET ORAL DAILY
Qty: 90 TABLET | Refills: 1 | Status: SHIPPED | OUTPATIENT
Start: 2023-09-26 | End: 2024-01-12 | Stop reason: SDUPTHER

## 2023-09-26 NOTE — TELEPHONE ENCOUNTER
Last seen yesterday   Requested Prescriptions     Pending Prescriptions Disp Refills    buPROPion (Wellbutrin) 75 mg tablet [Pharmacy Med Name: BUPROPION HCL 75 MG TABLET] 90 tablet 0     Sig: TAKE 1 TABLET BY MOUTH ONCE DAILY.

## 2023-10-02 ENCOUNTER — TELEPHONE (OUTPATIENT)
Dept: PRIMARY CARE | Facility: CLINIC | Age: 63
End: 2023-10-02
Payer: MEDICARE

## 2023-10-02 DIAGNOSIS — R92.8 ABNORMAL MAMMOGRAM OF RIGHT BREAST: Primary | ICD-10-CM

## 2023-10-04 DIAGNOSIS — I10 ESSENTIAL HYPERTENSION: ICD-10-CM

## 2023-10-05 ENCOUNTER — TELEPHONE (OUTPATIENT)
Dept: NEUROLOGY | Facility: HOSPITAL | Age: 63
End: 2023-10-05

## 2023-10-05 RX ORDER — AMLODIPINE BESYLATE 10 MG/1
10 TABLET ORAL DAILY
Qty: 90 TABLET | Refills: 1 | OUTPATIENT
Start: 2023-10-05

## 2023-10-05 NOTE — TELEPHONE ENCOUNTER
Gina from [facility] called needing some questions asked about the pt's meds.  Please [phone] reference # [id number]

## 2023-10-05 NOTE — TELEPHONE ENCOUNTER
Gina from [facility] called needing some questions asked about the pt's meds.  Please [phone] reference # [id number]

## 2023-10-11 DIAGNOSIS — G36.0 NEUROMYELITIS OPTICA (MULTI): ICD-10-CM

## 2023-10-12 RX ORDER — AZATHIOPRINE 50 MG/1
200 TABLET ORAL DAILY
Qty: 360 TABLET | Refills: 3 | Status: SHIPPED | OUTPATIENT
Start: 2023-10-12 | End: 2024-10-11

## 2023-10-26 ENCOUNTER — LAB (OUTPATIENT)
Dept: LAB | Facility: LAB | Age: 63
End: 2023-10-26
Payer: MEDICARE

## 2023-10-26 DIAGNOSIS — E11.69 TYPE 2 DIABETES MELLITUS WITH OTHER SPECIFIED COMPLICATION, WITHOUT LONG-TERM CURRENT USE OF INSULIN (MULTI): ICD-10-CM

## 2023-10-26 DIAGNOSIS — E78.5 DYSLIPIDEMIA: ICD-10-CM

## 2023-10-26 DIAGNOSIS — F41.9 ANXIETY: ICD-10-CM

## 2023-10-26 DIAGNOSIS — I10 ESSENTIAL HYPERTENSION: ICD-10-CM

## 2023-10-26 LAB
ALBUMIN SERPL BCP-MCNC: 4.3 G/DL (ref 3.4–5)
ALP SERPL-CCNC: 88 U/L (ref 33–136)
ALT SERPL W P-5'-P-CCNC: 38 U/L (ref 7–45)
ANION GAP SERPL CALC-SCNC: 13 MMOL/L (ref 10–20)
AST SERPL W P-5'-P-CCNC: 26 U/L (ref 9–39)
BASOPHILS # BLD AUTO: 0.03 X10*3/UL (ref 0–0.1)
BASOPHILS NFR BLD AUTO: 0.4 %
BILIRUB SERPL-MCNC: 0.8 MG/DL (ref 0–1.2)
BUN SERPL-MCNC: 7 MG/DL (ref 6–23)
CALCIUM SERPL-MCNC: 9.5 MG/DL (ref 8.6–10.6)
CHLORIDE SERPL-SCNC: 102 MMOL/L (ref 98–107)
CHOLEST SERPL-MCNC: 148 MG/DL (ref 0–199)
CHOLESTEROL/HDL RATIO: 3.7
CO2 SERPL-SCNC: 28 MMOL/L (ref 21–32)
CREAT SERPL-MCNC: 0.51 MG/DL (ref 0.5–1.05)
EOSINOPHIL # BLD AUTO: 0.11 X10*3/UL (ref 0–0.7)
EOSINOPHIL NFR BLD AUTO: 1.5 %
ERYTHROCYTE [DISTWIDTH] IN BLOOD BY AUTOMATED COUNT: 13.7 % (ref 11.5–14.5)
EST. AVERAGE GLUCOSE BLD GHB EST-MCNC: 128 MG/DL
GFR SERPL CREATININE-BSD FRML MDRD: >90 ML/MIN/1.73M*2
GLUCOSE SERPL-MCNC: 113 MG/DL (ref 74–99)
HBA1C MFR BLD: 6.1 %
HCT VFR BLD AUTO: 41.1 % (ref 36–46)
HDLC SERPL-MCNC: 39.9 MG/DL
HGB BLD-MCNC: 12.9 G/DL (ref 12–16)
IMM GRANULOCYTES # BLD AUTO: 0.02 X10*3/UL (ref 0–0.7)
IMM GRANULOCYTES NFR BLD AUTO: 0.3 % (ref 0–0.9)
LDLC SERPL CALC-MCNC: 95 MG/DL
LYMPHOCYTES # BLD AUTO: 1.25 X10*3/UL (ref 1.2–4.8)
LYMPHOCYTES NFR BLD AUTO: 17.5 %
MCH RBC QN AUTO: 26.7 PG (ref 26–34)
MCHC RBC AUTO-ENTMCNC: 31.4 G/DL (ref 32–36)
MCV RBC AUTO: 85 FL (ref 80–100)
MONOCYTES # BLD AUTO: 0.45 X10*3/UL (ref 0.1–1)
MONOCYTES NFR BLD AUTO: 6.3 %
NEUTROPHILS # BLD AUTO: 5.3 X10*3/UL (ref 1.2–7.7)
NEUTROPHILS NFR BLD AUTO: 74 %
NON HDL CHOLESTEROL: 108 MG/DL (ref 0–149)
NRBC BLD-RTO: 0 /100 WBCS (ref 0–0)
PLATELET # BLD AUTO: 332 X10*3/UL (ref 150–450)
PMV BLD AUTO: 11 FL (ref 7.5–11.5)
POTASSIUM SERPL-SCNC: 4.2 MMOL/L (ref 3.5–5.3)
PROT SERPL-MCNC: 6.8 G/DL (ref 6.4–8.2)
RBC # BLD AUTO: 4.83 X10*6/UL (ref 4–5.2)
SODIUM SERPL-SCNC: 139 MMOL/L (ref 136–145)
TRIGL SERPL-MCNC: 64 MG/DL (ref 0–149)
TSH SERPL-ACNC: 1.93 MIU/L (ref 0.44–3.98)
VLDL: 13 MG/DL (ref 0–40)
WBC # BLD AUTO: 7.2 X10*3/UL (ref 4.4–11.3)

## 2023-10-26 PROCEDURE — 84443 ASSAY THYROID STIM HORMONE: CPT

## 2023-10-26 PROCEDURE — 80053 COMPREHEN METABOLIC PANEL: CPT

## 2023-10-26 PROCEDURE — 80061 LIPID PANEL: CPT

## 2023-10-26 PROCEDURE — 36415 COLL VENOUS BLD VENIPUNCTURE: CPT

## 2023-10-26 PROCEDURE — 85025 COMPLETE CBC W/AUTO DIFF WBC: CPT

## 2023-10-26 PROCEDURE — 83036 HEMOGLOBIN GLYCOSYLATED A1C: CPT

## 2023-11-16 ENCOUNTER — ANCILLARY PROCEDURE (OUTPATIENT)
Dept: RADIOLOGY | Facility: CLINIC | Age: 63
End: 2023-11-16
Payer: MEDICARE

## 2023-11-16 VITALS — WEIGHT: 177.69 LBS | HEIGHT: 65 IN | BODY MASS INDEX: 29.61 KG/M2

## 2023-11-16 DIAGNOSIS — R92.8 ABNORMAL MAMMOGRAM OF RIGHT BREAST: ICD-10-CM

## 2023-11-16 PROCEDURE — 77065 DX MAMMO INCL CAD UNI: CPT | Mod: RT

## 2023-11-16 PROCEDURE — G0279 TOMOSYNTHESIS, MAMMO: HCPCS | Mod: RIGHT SIDE | Performed by: RADIOLOGY

## 2023-11-16 PROCEDURE — 77065 DX MAMMO INCL CAD UNI: CPT | Mod: RIGHT SIDE | Performed by: RADIOLOGY

## 2023-11-16 PROCEDURE — 76642 ULTRASOUND BREAST LIMITED: CPT | Mod: RIGHT SIDE | Performed by: RADIOLOGY

## 2023-11-16 PROCEDURE — 76642 ULTRASOUND BREAST LIMITED: CPT | Mod: RT

## 2023-11-22 DIAGNOSIS — Z12.11 COLON CANCER SCREENING: ICD-10-CM

## 2023-11-22 RX ORDER — POLYETHYLENE GLYCOL 3350, SODIUM SULFATE ANHYDROUS, SODIUM BICARBONATE, SODIUM CHLORIDE, POTASSIUM CHLORIDE 236; 22.74; 6.74; 5.86; 2.97 G/4L; G/4L; G/4L; G/4L; G/4L
4000 POWDER, FOR SOLUTION ORAL ONCE
Qty: 4000 ML | Refills: 0 | Status: SHIPPED | OUTPATIENT
Start: 2023-11-22 | End: 2023-11-22

## 2023-12-06 ENCOUNTER — APPOINTMENT (OUTPATIENT)
Dept: GASTROENTEROLOGY | Facility: EXTERNAL LOCATION | Age: 63
End: 2023-12-06
Payer: MEDICARE

## 2023-12-20 ENCOUNTER — APPOINTMENT (OUTPATIENT)
Dept: PRIMARY CARE | Facility: CLINIC | Age: 63
End: 2023-12-20
Payer: MEDICARE

## 2023-12-23 DIAGNOSIS — E11.9 TYPE 2 DIABETES MELLITUS WITHOUT COMPLICATIONS (MULTI): ICD-10-CM

## 2023-12-26 RX ORDER — TIRZEPATIDE 7.5 MG/.5ML
7.5 INJECTION, SOLUTION SUBCUTANEOUS
Qty: 2 ML | Refills: 0 | Status: SHIPPED | OUTPATIENT
Start: 2023-12-26 | End: 2024-01-08

## 2023-12-26 RX ORDER — TIRZEPATIDE 5 MG/.5ML
5 INJECTION, SOLUTION SUBCUTANEOUS
Qty: 2 ML | Refills: 0 | Status: SHIPPED | OUTPATIENT
Start: 2023-12-26 | End: 2024-01-08

## 2023-12-29 DIAGNOSIS — I10 ESSENTIAL HYPERTENSION: ICD-10-CM

## 2024-01-01 RX ORDER — HYDROCHLOROTHIAZIDE 25 MG/1
25 TABLET ORAL DAILY
Qty: 90 TABLET | Refills: 0 | Status: SHIPPED | OUTPATIENT
Start: 2024-01-01 | End: 2024-01-12 | Stop reason: SDUPTHER

## 2024-01-02 ENCOUNTER — TELEPHONE (OUTPATIENT)
Dept: PRIMARY CARE | Facility: CLINIC | Age: 64
End: 2024-01-02

## 2024-01-02 DIAGNOSIS — E11.69 TYPE 2 DIABETES MELLITUS WITH OTHER SPECIFIED COMPLICATION, WITHOUT LONG-TERM CURRENT USE OF INSULIN (MULTI): ICD-10-CM

## 2024-01-02 DIAGNOSIS — I10 ESSENTIAL HYPERTENSION: Primary | ICD-10-CM

## 2024-01-03 ENCOUNTER — LAB (OUTPATIENT)
Dept: LAB | Facility: LAB | Age: 64
End: 2024-01-03
Payer: MEDICARE

## 2024-01-03 ENCOUNTER — TELEPHONE (OUTPATIENT)
Dept: PRIMARY CARE | Facility: CLINIC | Age: 64
End: 2024-01-03

## 2024-01-03 DIAGNOSIS — I10 ESSENTIAL HYPERTENSION: Primary | ICD-10-CM

## 2024-01-03 DIAGNOSIS — E11.9 TYPE 2 DIABETES MELLITUS WITHOUT COMPLICATIONS (MULTI): Primary | ICD-10-CM

## 2024-01-03 DIAGNOSIS — E11.69 TYPE 2 DIABETES MELLITUS WITH OTHER SPECIFIED COMPLICATION, WITHOUT LONG-TERM CURRENT USE OF INSULIN (MULTI): ICD-10-CM

## 2024-01-03 DIAGNOSIS — E78.5 DYSLIPIDEMIA: ICD-10-CM

## 2024-01-03 DIAGNOSIS — E11.69 TYPE 2 DIABETES MELLITUS WITH OTHER SPECIFIED COMPLICATION, WITHOUT LONG-TERM CURRENT USE OF INSULIN (MULTI): Primary | ICD-10-CM

## 2024-01-03 LAB
EST. AVERAGE GLUCOSE BLD GHB EST-MCNC: 123 MG/DL
HBA1C MFR BLD: 5.9 %

## 2024-01-03 PROCEDURE — 83036 HEMOGLOBIN GLYCOSYLATED A1C: CPT

## 2024-01-03 PROCEDURE — 36415 COLL VENOUS BLD VENIPUNCTURE: CPT

## 2024-01-03 RX ORDER — TIRZEPATIDE 2.5 MG/.5ML
2.5 INJECTION, SOLUTION SUBCUTANEOUS
Qty: 2 ML | Refills: 5 | Status: SHIPPED | OUTPATIENT
Start: 2024-01-03 | End: 2024-02-16

## 2024-01-03 NOTE — TELEPHONE ENCOUNTER
Patient called to request mounjaro 2.5mg be sent to pharmacy on file. She recently took the 5mg dosage and has been having discomfort and belching. She states she previously took 5mg and was advised by Dr. Kim to discontinue and go back to 2.5mg due to upper gi upset.

## 2024-01-03 NOTE — TELEPHONE ENCOUNTER
Pt moved appt to 1/12/24, would like BW order for tomorrow morning. Pt states she has been feeling heavily fatigued and some blurry vision in her eyes. Would like to know if BW would be the same as what she had ordered in October? Wants to get BW done sooner due to not feeling well.

## 2024-01-05 ENCOUNTER — LAB (OUTPATIENT)
Dept: LAB | Facility: LAB | Age: 64
End: 2024-01-05
Payer: MEDICARE

## 2024-01-05 DIAGNOSIS — E78.5 DYSLIPIDEMIA: ICD-10-CM

## 2024-01-05 DIAGNOSIS — I10 ESSENTIAL HYPERTENSION: ICD-10-CM

## 2024-01-05 LAB
ALBUMIN SERPL BCP-MCNC: 4.5 G/DL (ref 3.4–5)
ALP SERPL-CCNC: 94 U/L (ref 33–136)
ALT SERPL W P-5'-P-CCNC: 19 U/L (ref 7–45)
ANION GAP SERPL CALC-SCNC: 11 MMOL/L (ref 10–20)
AST SERPL W P-5'-P-CCNC: 18 U/L (ref 9–39)
BASOPHILS # BLD AUTO: 0.03 X10*3/UL (ref 0–0.1)
BASOPHILS NFR BLD AUTO: 0.6 %
BILIRUB SERPL-MCNC: 0.9 MG/DL (ref 0–1.2)
BUN SERPL-MCNC: 8 MG/DL (ref 6–23)
CALCIUM SERPL-MCNC: 9.7 MG/DL (ref 8.6–10.6)
CHLORIDE SERPL-SCNC: 102 MMOL/L (ref 98–107)
CHOLEST SERPL-MCNC: 152 MG/DL (ref 0–199)
CHOLESTEROL/HDL RATIO: 3.6
CO2 SERPL-SCNC: 31 MMOL/L (ref 21–32)
CREAT SERPL-MCNC: 0.53 MG/DL (ref 0.5–1.05)
EOSINOPHIL # BLD AUTO: 0.1 X10*3/UL (ref 0–0.7)
EOSINOPHIL NFR BLD AUTO: 2.2 %
ERYTHROCYTE [DISTWIDTH] IN BLOOD BY AUTOMATED COUNT: 13.9 % (ref 11.5–14.5)
GFR SERPL CREATININE-BSD FRML MDRD: >90 ML/MIN/1.73M*2
GLUCOSE SERPL-MCNC: 101 MG/DL (ref 74–99)
HCT VFR BLD AUTO: 41.6 % (ref 36–46)
HDLC SERPL-MCNC: 41.7 MG/DL
HGB BLD-MCNC: 13.3 G/DL (ref 12–16)
IMM GRANULOCYTES # BLD AUTO: 0.01 X10*3/UL (ref 0–0.7)
IMM GRANULOCYTES NFR BLD AUTO: 0.2 % (ref 0–0.9)
LDLC SERPL CALC-MCNC: 99 MG/DL
LYMPHOCYTES # BLD AUTO: 1.61 X10*3/UL (ref 1.2–4.8)
LYMPHOCYTES NFR BLD AUTO: 34.8 %
MCH RBC QN AUTO: 26.2 PG (ref 26–34)
MCHC RBC AUTO-ENTMCNC: 32 G/DL (ref 32–36)
MCV RBC AUTO: 82 FL (ref 80–100)
MONOCYTES # BLD AUTO: 0.34 X10*3/UL (ref 0.1–1)
MONOCYTES NFR BLD AUTO: 7.4 %
NEUTROPHILS # BLD AUTO: 2.53 X10*3/UL (ref 1.2–7.7)
NEUTROPHILS NFR BLD AUTO: 54.8 %
NON HDL CHOLESTEROL: 110 MG/DL (ref 0–149)
NRBC BLD-RTO: 0 /100 WBCS (ref 0–0)
PLATELET # BLD AUTO: 339 X10*3/UL (ref 150–450)
POTASSIUM SERPL-SCNC: 3.7 MMOL/L (ref 3.5–5.3)
PROT SERPL-MCNC: 6.9 G/DL (ref 6.4–8.2)
RBC # BLD AUTO: 5.07 X10*6/UL (ref 4–5.2)
SODIUM SERPL-SCNC: 140 MMOL/L (ref 136–145)
TRIGL SERPL-MCNC: 58 MG/DL (ref 0–149)
VLDL: 12 MG/DL (ref 0–40)
WBC # BLD AUTO: 4.6 X10*3/UL (ref 4.4–11.3)

## 2024-01-05 PROCEDURE — 80061 LIPID PANEL: CPT

## 2024-01-05 PROCEDURE — 85025 COMPLETE CBC W/AUTO DIFF WBC: CPT

## 2024-01-05 PROCEDURE — 80053 COMPREHEN METABOLIC PANEL: CPT

## 2024-01-05 PROCEDURE — 36415 COLL VENOUS BLD VENIPUNCTURE: CPT

## 2024-01-05 NOTE — TELEPHONE ENCOUNTER
"Patient called to advise that she has new insurance and \"needs a prior authorization\" on her medications for [employer].    Left voicemail for patient to call the office back for clarification on her voicemail.   "

## 2024-01-08 NOTE — TELEPHONE ENCOUNTER
Fax received from [facility].  It was completed and signed by Dr. Kim.  Faxed back at 12:30 PM. Document to be scanned to chart.

## 2024-01-08 NOTE — TELEPHONE ENCOUNTER
"Received call from Mora at [facility] pharmacy.  She advised that the Mounjaro was approved from 1/1/2024-12/31/2024, however the patient asked that [facility] call the office to request Dr. Kim to complete a \"peer exception\" due to the 47.00 copay.  They are faxing over the form for completion.   "

## 2024-01-08 NOTE — TELEPHONE ENCOUNTER
"Patient called back.  She expressed her concern and frustration that I asked for clarification on her message from 1/5/2024.  She states that I should have known which medication she was asking about because she is only prescribed one medication from Dr. Kim. I did apologize to her, however I explained that I can not guess which medication or dose she was calling about.  She states that this \"error\" on my end was delaying her prior authorization to be started.  I advised her that her prior authorization was initiated today.  We are awaiting her insurance determination.  Patient also asked that her Mounjaro 5 mg and 7.5 mg dose be removed from her medication profile, as she had the incorrect dose sent to her pharmacy.  I looked into this and on 12/26/23 her pharmacy requested this medication on her behalf.  This was not initiated by our office.  I explained this to the patient and she continued to tell me that I have made and error and I need to \"own it\".    Mounjaro 5 mg and 7.5 mg have been removed from her medication profile today as per her request.    The patient abruptly ended the conversation.    "

## 2024-01-12 ENCOUNTER — LAB (OUTPATIENT)
Dept: LAB | Facility: LAB | Age: 64
End: 2024-01-12
Payer: MEDICARE

## 2024-01-12 ENCOUNTER — OFFICE VISIT (OUTPATIENT)
Dept: PRIMARY CARE | Facility: CLINIC | Age: 64
End: 2024-01-12
Payer: MEDICARE

## 2024-01-12 VITALS
DIASTOLIC BLOOD PRESSURE: 80 MMHG | HEIGHT: 67 IN | RESPIRATION RATE: 16 BRPM | TEMPERATURE: 97.8 F | HEART RATE: 74 BPM | BODY MASS INDEX: 26.92 KG/M2 | WEIGHT: 171.52 LBS | OXYGEN SATURATION: 98 % | SYSTOLIC BLOOD PRESSURE: 150 MMHG

## 2024-01-12 DIAGNOSIS — R53.83 OTHER FATIGUE: ICD-10-CM

## 2024-01-12 DIAGNOSIS — I10 ESSENTIAL HYPERTENSION: Primary | ICD-10-CM

## 2024-01-12 DIAGNOSIS — F41.9 ANXIETY DISORDER, UNSPECIFIED: ICD-10-CM

## 2024-01-12 DIAGNOSIS — F32.A DEPRESSION, UNSPECIFIED: ICD-10-CM

## 2024-01-12 DIAGNOSIS — G36.0 NEUROMYELITIS OPTICA (MULTI): ICD-10-CM

## 2024-01-12 DIAGNOSIS — E78.5 DYSLIPIDEMIA: ICD-10-CM

## 2024-01-12 DIAGNOSIS — E11.69 TYPE 2 DIABETES MELLITUS WITH OTHER SPECIFIED COMPLICATION, WITHOUT LONG-TERM CURRENT USE OF INSULIN (MULTI): ICD-10-CM

## 2024-01-12 DIAGNOSIS — F32.0 DEPRESSION, MAJOR, SINGLE EPISODE, MILD (CMS-HCC): ICD-10-CM

## 2024-01-12 DIAGNOSIS — E66.09 CLASS 1 OBESITY DUE TO EXCESS CALORIES WITH SERIOUS COMORBIDITY AND BODY MASS INDEX (BMI) OF 31.0 TO 31.9 IN ADULT: ICD-10-CM

## 2024-01-12 LAB
TSH SERPL-ACNC: 1.71 MIU/L (ref 0.44–3.98)
VIT B12 SERPL-MCNC: 478 PG/ML (ref 211–911)

## 2024-01-12 PROCEDURE — 3044F HG A1C LEVEL LT 7.0%: CPT | Performed by: FAMILY MEDICINE

## 2024-01-12 PROCEDURE — 3077F SYST BP >= 140 MM HG: CPT | Performed by: FAMILY MEDICINE

## 2024-01-12 PROCEDURE — 82607 VITAMIN B-12: CPT

## 2024-01-12 PROCEDURE — 3048F LDL-C <100 MG/DL: CPT | Performed by: FAMILY MEDICINE

## 2024-01-12 PROCEDURE — 99214 OFFICE O/P EST MOD 30 MIN: CPT | Performed by: FAMILY MEDICINE

## 2024-01-12 PROCEDURE — 3079F DIAST BP 80-89 MM HG: CPT | Performed by: FAMILY MEDICINE

## 2024-01-12 PROCEDURE — 36415 COLL VENOUS BLD VENIPUNCTURE: CPT

## 2024-01-12 PROCEDURE — 1036F TOBACCO NON-USER: CPT | Performed by: FAMILY MEDICINE

## 2024-01-12 PROCEDURE — 3008F BODY MASS INDEX DOCD: CPT | Performed by: FAMILY MEDICINE

## 2024-01-12 PROCEDURE — 84443 ASSAY THYROID STIM HORMONE: CPT

## 2024-01-12 RX ORDER — HYDROCHLOROTHIAZIDE 25 MG/1
25 TABLET ORAL DAILY
Qty: 90 TABLET | Refills: 0 | Status: SHIPPED | OUTPATIENT
Start: 2024-01-12

## 2024-01-12 RX ORDER — ATORVASTATIN CALCIUM 10 MG/1
10 TABLET, FILM COATED ORAL DAILY
Qty: 30 TABLET | Refills: 11
Start: 2024-01-12 | End: 2024-02-21 | Stop reason: SINTOL

## 2024-01-12 RX ORDER — AMLODIPINE BESYLATE 10 MG/1
10 TABLET ORAL DAILY
Qty: 90 TABLET | Refills: 1 | Status: SHIPPED | OUTPATIENT
Start: 2024-01-12

## 2024-01-12 RX ORDER — ESCITALOPRAM OXALATE 20 MG/1
20 TABLET ORAL DAILY
Qty: 90 TABLET | Refills: 0 | Status: SHIPPED | OUTPATIENT
Start: 2024-01-12

## 2024-01-12 RX ORDER — BUPROPION HYDROCHLORIDE 75 MG/1
75 TABLET ORAL DAILY
Qty: 90 TABLET | Refills: 1 | Status: SHIPPED | OUTPATIENT
Start: 2024-01-12

## 2024-01-12 NOTE — ASSESSMENT & PLAN NOTE
Stable LDL not at 70 or less  Consider statin use  Reviewed labs.  Reviewed diet and exercise.  Recheck in 6 months

## 2024-01-12 NOTE — ASSESSMENT & PLAN NOTE
Stable  Slight worsening due to weather and time of year.  Refilling meds at same dose.  Continue to monitor

## 2024-01-12 NOTE — PROGRESS NOTES
Subjective   Patient ID: Celeste Neely is a 63 y.o. female who presents for Follow-up (Fatigue and BW results. States still really tired).    Here for general check and to review labs.    Admits to fatigue worsening for the past 2 months.  Wonders if diet, vegetarian or thyroid.  Not sleeping well at night, up going to BR several times  Denies urine sx  Fatiqued during the day from not sleeping.  No snoring or difficulty breathing  Admits to depressive sx with weather and time of year  Imuran increased April   Denies chest pain, shortness of breath, lightheaded, dizziness or headaches.   Denies upper resp sx.     Was exercise 2 times a week until hospitalized  Exercise seems to cause flares of illness.   Can walk without issues.    Recent labs within acceptable range.  Cholesterol at goal.  A1c level improving.     Diet is good  Limits salt and processed foods  Stopped BEET juice which she feels helps.     Has not scheduled colonscopy yet    STATIN use?           Review of Systems    Objective   There were no vitals taken for this visit.    Physical Exam  Vitals and nursing note reviewed.   Constitutional:       Appearance: Normal appearance.   Cardiovascular:      Rate and Rhythm: Normal rate and regular rhythm.   Pulmonary:      Effort: Pulmonary effort is normal.      Breath sounds: Normal breath sounds.   Musculoskeletal:      Cervical back: Normal range of motion.   Neurological:      Mental Status: She is alert.   Psychiatric:         Mood and Affect: Mood normal.         Behavior: Behavior normal.         Thought Content: Thought content normal.         Judgment: Judgment normal.       Assessment/Plan   Problem List Items Addressed This Visit             ICD-10-CM    Depression, major, single episode, mild (CMS/HCC) F32.0     Stable  Slight worsening due to weather and time of year.  Refilling meds at same dose.  Continue to monitor         Dyslipidemia E78.5     Stable LDL not at 70 or less  Consider statin  use  Reviewed labs.  Reviewed diet and exercise.  Recheck in 6 months         Relevant Medications    atorvastatin (Lipitor) 10 mg tablet    Essential hypertension - Primary I10     Uncontrolled  Possible cause of fatigue.  Obtain blood pressure monitor and check blood pressure daily and record.  Limit salt and processed foods in diet.  Recheck in 1 month         Relevant Medications    hydroCHLOROthiazide (HYDRODiuril) 25 mg tablet    amLODIPine (Norvasc) 10 mg tablet    Neuromyelitis optica (CMS/[facility]) G36.0     Stable  Continue care per rheumatology/ophthalmology         Type 2 diabetes mellitus with other specified complication, without long-term current use of insulin (CMS/[facility]) E11.69     Improving with GLP1  Reviewed labs  Due for urine albumin  Continue care per endocrinology           Relevant Medications    atorvastatin (Lipitor) 10 mg tablet    Class 1 obesity due to excess calories with serious comorbidity and body mass index (BMI) of 31.0 to 31.9 in adult E66.09, Z68.31     Improving with GLP-1.  Continue weight loss efforts.  Continue to monitor          Other Visit Diagnoses         Codes    Other fatigue     R53.83    Relevant Orders    TSH with reflex to Free T4 if abnormal    Vitamin B12    Depression, unspecified     F32.A    Relevant Medications    escitalopram (Lexapro) 20 mg tablet    Anxiety disorder, unspecified     F41.9    Relevant Medications    buPROPion (Wellbutrin) 75 mg tablet

## 2024-01-12 NOTE — ASSESSMENT & PLAN NOTE
Uncontrolled  Possible cause of fatigue.  Obtain blood pressure monitor and check blood pressure daily and record.  Limit salt and processed foods in diet.  Recheck in 1 month

## 2024-02-05 ENCOUNTER — OFFICE VISIT (OUTPATIENT)
Dept: OTOLARYNGOLOGY | Facility: CLINIC | Age: 64
End: 2024-02-05
Payer: MEDICARE

## 2024-02-05 ENCOUNTER — TELEPHONE (OUTPATIENT)
Dept: ENDOCRINOLOGY | Facility: CLINIC | Age: 64
End: 2024-02-05

## 2024-02-05 VITALS — TEMPERATURE: 97.5 F | HEIGHT: 67 IN | BODY MASS INDEX: 27.27 KG/M2

## 2024-02-05 DIAGNOSIS — J34.89 PAIN OF MAXILLARY SINUS: ICD-10-CM

## 2024-02-05 DIAGNOSIS — H61.23 BILATERAL IMPACTED CERUMEN: Primary | ICD-10-CM

## 2024-02-05 DIAGNOSIS — H90.3 SENSORINEURAL HEARING LOSS (SNHL) OF BOTH EARS: ICD-10-CM

## 2024-02-05 PROCEDURE — 1036F TOBACCO NON-USER: CPT | Performed by: NURSE PRACTITIONER

## 2024-02-05 PROCEDURE — 69210 REMOVE IMPACTED EAR WAX UNI: CPT | Performed by: NURSE PRACTITIONER

## 2024-02-05 PROCEDURE — 3048F LDL-C <100 MG/DL: CPT | Performed by: NURSE PRACTITIONER

## 2024-02-05 PROCEDURE — 99203 OFFICE O/P NEW LOW 30 MIN: CPT | Performed by: NURSE PRACTITIONER

## 2024-02-05 PROCEDURE — 3008F BODY MASS INDEX DOCD: CPT | Performed by: NURSE PRACTITIONER

## 2024-02-05 PROCEDURE — 3044F HG A1C LEVEL LT 7.0%: CPT | Performed by: NURSE PRACTITIONER

## 2024-02-05 RX ORDER — FLUTICASONE PROPIONATE 50 MCG
1 SPRAY, SUSPENSION (ML) NASAL 2 TIMES DAILY
Qty: 16 G | Refills: 11 | Status: SHIPPED | OUTPATIENT
Start: 2024-02-05 | End: 2025-02-04

## 2024-02-05 ASSESSMENT — PATIENT HEALTH QUESTIONNAIRE - PHQ9
SUM OF ALL RESPONSES TO PHQ9 QUESTIONS 1 AND 2: 0
1. LITTLE INTEREST OR PLEASURE IN DOING THINGS: NOT AT ALL
2. FEELING DOWN, DEPRESSED OR HOPELESS: NOT AT ALL

## 2024-02-05 NOTE — PROGRESS NOTES
Subjective   Patient ID: Celeste Neely is a 63 y.o. female who presents for Hearing Problem (Thinks its because of wax).  Hearing Problem      This patient is referred for evaluation of a sensation of clogged ears.  The patient is not accompanied by anyone.   When asked about ear pain, hearing loss, itching, discharge from ear, tinnitus, aural fullness or autophony, the patient admits to recent increase in hearing loss mainly on the right side even when wearing her hearing aids.  She also complains of maxillary and frontal sinus pain.  She is not currently using any nasal spray.     When asked about a significant past otological history including history of prior ear surgery, noise exposure, exposure to ototoxic drugs or agents, and/or family history of hearing loss, the patient admits to none.    Review of Systems  A comprehensive or 10 points review of the patient´s constitutional, neurological, HEENT, pulmonary, cardiovascular and genito-urinary systems showed only those mentioned in history of present illness.    Objective   Physical Exam  Constitutional: no fever, chills, weight loss or weight gain   General appearance: Appears well, well-nourished, well groomed. No acute distress.   Communication: Normal communication   Psychiatric: Oriented to person, place and time. Normal mood and affect.   Neurologic: Cranial nerves II-XII grossly intact and symmetric bilaterally.   Head and Face:   Head: Atraumatic with no masses, lesions or scarring.   Face: Normal symmetry, no paralysis, synkinesis or facial tic. No scars or deformities.     Eyes: Conjunctiva not edematous or erythematous   Ears: External inspection of ears with no deformity, scars or masses.  Bilateral canals with cerumen impactions     Neck: Normal appearing, symmetric, trachea midline.   Cardiovascular: Examination of peripheral vascular system shows no clubbing or cyanosis.   Respiratory: No respiratory distress increased work of breathing.  Inspection of the chest with symmetric chest expansion and normal respiratory effort.   Skin: No rashes in the head or neck    Assessment/Plan        This patient presents for initial evaluation of acute acquired bilateral cerumen impaction as well as chronic bilateral sensorineural hearing loss and maxillary sinus pain.    Reassurance given that otologic exam is normal after cleaning.  Patient reports improvement in her hearing after cleaning.  I recommended a 6-week course of Flonase.  She was instructed on proper technique.  She will also be scheduled to follow-up with rhinology in 1 month.  She may follow-up with me as needed.  All questions were answered to patient's satisfaction.    This note was created using speech recognition transcription software. Despite proofreading, several typographical errors might be present that might affect the meaning of the content. Please call with any questions.  Patient ID: Celeste Neely is a 63 y.o. female.    Ear cerumen removal    Date/Time: 2/5/2024 9:27 AM    Performed by: YESICA Cortez  Authorized by: YESICA Cortez    Consent:     Consent obtained:  Verbal    Consent given by:  Patient    Risks discussed:  Pain    Alternatives discussed:  No treatment  Procedure details:     Location:  L ear and R ear    Procedure type: curette      Procedure outcomes: cerumen removed    Post-procedure details:     Inspection:  No bleeding, ear canal clear and TM intact    Hearing quality:  Improved    Procedure completion:  Tolerated well, no immediate complications      YESICA Cortez 02/05/24 9:25 AM

## 2024-02-05 NOTE — TELEPHONE ENCOUNTER
Patient called back to add that the only changes that she has made recently was adding beet juice once daily to help lower her blood pressure and drinking grapefruit juice daily.  She didn't know if either of these would have any affect on Mounjaro.

## 2024-02-05 NOTE — TELEPHONE ENCOUNTER
Patient called and state sugars has been between 270-300 range, and the last 2 days, patient state she has not been checking sugars regularly because sugars has been in range, patient is asking if she should take a higher dose of Mounjaro?

## 2024-02-14 NOTE — TELEPHONE ENCOUNTER
Patient called today to see if you can do a peer to peer to lower her tier for her Mounjaro, because the medication is 300.00 with [place] approval   #[phone]

## 2024-02-16 ENCOUNTER — APPOINTMENT (OUTPATIENT)
Dept: OTOLARYNGOLOGY | Facility: CLINIC | Age: 64
End: 2024-02-16
Payer: MEDICARE

## 2024-02-16 DIAGNOSIS — E11.69 TYPE 2 DIABETES MELLITUS WITH OTHER SPECIFIED COMPLICATION, WITHOUT LONG-TERM CURRENT USE OF INSULIN (MULTI): ICD-10-CM

## 2024-02-16 RX ORDER — TIRZEPATIDE 7.5 MG/.5ML
7.5 INJECTION, SOLUTION SUBCUTANEOUS
Qty: 2 ML | Refills: 0 | Status: SHIPPED | OUTPATIENT
Start: 2024-02-16 | End: 2024-02-20 | Stop reason: WASHOUT

## 2024-02-16 RX ORDER — TIRZEPATIDE 5 MG/.5ML
5 INJECTION, SOLUTION SUBCUTANEOUS
Qty: 2 ML | Refills: 0 | Status: SHIPPED | OUTPATIENT
Start: 2024-02-16 | End: 2024-02-20 | Stop reason: WASHOUT

## 2024-02-20 ENCOUNTER — OFFICE VISIT (OUTPATIENT)
Dept: ENDOCRINOLOGY | Facility: CLINIC | Age: 64
End: 2024-02-20
Payer: MEDICARE

## 2024-02-20 VITALS
HEART RATE: 74 BPM | DIASTOLIC BLOOD PRESSURE: 72 MMHG | SYSTOLIC BLOOD PRESSURE: 116 MMHG | WEIGHT: 171 LBS | BODY MASS INDEX: 27.19 KG/M2

## 2024-02-20 DIAGNOSIS — E04.1 LEFT THYROID NODULE: Primary | ICD-10-CM

## 2024-02-20 DIAGNOSIS — E11.9 TYPE 2 DIABETES MELLITUS WITHOUT COMPLICATION, WITHOUT LONG-TERM CURRENT USE OF INSULIN (MULTI): ICD-10-CM

## 2024-02-20 PROCEDURE — 3048F LDL-C <100 MG/DL: CPT | Performed by: INTERNAL MEDICINE

## 2024-02-20 PROCEDURE — 3078F DIAST BP <80 MM HG: CPT | Performed by: INTERNAL MEDICINE

## 2024-02-20 PROCEDURE — 99215 OFFICE O/P EST HI 40 MIN: CPT | Performed by: INTERNAL MEDICINE

## 2024-02-20 PROCEDURE — 3044F HG A1C LEVEL LT 7.0%: CPT | Performed by: INTERNAL MEDICINE

## 2024-02-20 PROCEDURE — 1036F TOBACCO NON-USER: CPT | Performed by: INTERNAL MEDICINE

## 2024-02-20 PROCEDURE — 3008F BODY MASS INDEX DOCD: CPT | Performed by: INTERNAL MEDICINE

## 2024-02-20 PROCEDURE — 3074F SYST BP LT 130 MM HG: CPT | Performed by: INTERNAL MEDICINE

## 2024-02-20 NOTE — PROGRESS NOTES
Subjective   Celeste Neely is a 63 y.o. female who presents for follow-up of Type 2 diabetes mellitus. The initial diagnosis of diabetes was made in 2010 .   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 She subsequently lost the weight and her hemoglobin A1c improved to 5.2%. She was taking off medications. In 2017 her disease flared up again and she was restarted on glucocorticoid for one year. She was admitted in April 2023 for neuromyelitis optica. She is no longer on Prednisone.    She was having hyperglycemia as she was utilizing grapefruit juice and beet juice for HTN.  She did have to temporarily use insulin therapy.  She has since discontinued these juices with improvement in glycemic control.    The cost of Mounjro has increased and was $300.  She therefore will need to discontinue the use of this.      Known complications due to diabetes included none    Cardiovascular risk factors include diabetes mellitus. The patient is not on an ACE inhibitor or angiotensin II receptor blocker.  The patient has not been previously hospitalized due to diabetic ketoacidosis.     Current symptoms/problems include none. Her clinical course has been stable.     The patient is currently checking the blood glucose.    Patient is using: glucometer  Can be 120mg/dL without Metformin or Glipizide  Today 96mg/dL     Hypoglycemia frequency: Denies   Hypoglycemia awareness: N/A       She did have a CT scan of her neck on December 6, 2022 which revealed a 3 cm lesion to the left thyroid gland.     Thyroid ultrasound was obtained on December 12, 2022 which revealed a moderately suspicious left thyroid nodule.     She underwent a fine-needle aspiration biopsy on February 2, 2023. Cytology revealed a benign follicular nodule.     Current Medication Regimen  Mounjaro 5mg subcutaneous once weekly      MEALS:   2pm - tuna on rye bread, apple, chips  Dinner- salmon, green beans, half potaotes    Snacks - potato chips, jpine paple, half apple, dry cereal, popcorn   Beverages - water with lemon, water with flavoring     Exercise regimen - denies as this triggers flares of NMO     Review of Systems   Respiratory:  Negative for shortness of breath.    Cardiovascular:  Negative for chest pain.   All other systems reviewed and are negative.      Objective   /72 (BP Location: Left arm, Patient Position: Sitting)   Pulse 74   Wt 77.6 kg (171 lb)   BMI 27.19 kg/m²   Physical Exam  Vitals and nursing note reviewed.   Constitutional:       General: She is not in acute distress.     Appearance: Normal appearance. She is normal weight.   HENT:      Head: Normocephalic and atraumatic.      Nose: Nose normal.      Mouth/Throat:      Mouth: Mucous membranes are moist.   Eyes:      Extraocular Movements: Extraocular movements intact.   Cardiovascular:      Rate and Rhythm: Normal rate and regular rhythm.   Pulmonary:      Effort: Pulmonary effort is normal.      Breath sounds: Normal breath sounds.   Musculoskeletal:         General: Normal range of motion.      Right foot: Normal range of motion. No deformity.      Left foot: Normal range of motion. No deformity.   Feet:      Right foot:      Skin integrity: Dry skin present. No ulcer or blister.      Toenail Condition: Right toenails are normal.      Left foot:      Skin integrity: Dry skin present. No ulcer or blister.      Toenail Condition: Left toenails are normal.   Skin:     General: Skin is warm.   Neurological:      Mental Status: She is alert and oriented to person, place, and time.   Psychiatric:         Mood and Affect: Mood normal.         Lab Review  Glucose (mg/dL)   Date Value   01/05/2024 101 (H)   10/26/2023 113 (H)   08/16/2023 140 (H)   06/21/2023 105 (H)   04/04/2023 242 (H)     Hemoglobin A1C (%)   Date Value   01/03/2024 5.9 (H)   10/26/2023 6.1 (H)   02/22/2023 6.0 (A)   06/08/2022 6.1 (A)   09/30/2021 6.6 (A)     Bicarbonate (mmol/L)    Date Value   01/05/2024 31   10/26/2023 28   08/16/2023 29   06/21/2023 27   04/04/2023 20 (L)     Urea Nitrogen (mg/dL)   Date Value   01/05/2024 8   10/26/2023 7   08/16/2023 6   06/21/2023 8   04/04/2023 17     Creatinine (mg/dL)   Date Value   01/05/2024 0.53   10/26/2023 0.51   08/16/2023 0.50   06/21/2023 0.62   04/04/2023 0.70     Lab Results   Component Value Date    CHOL 152 01/05/2024    CHOL 148 10/26/2023    CHOL 181 02/22/2023     Lab Results   Component Value Date    HDL 41.7 01/05/2024    HDL 39.9 10/26/2023    HDL 37.7 (A) 02/22/2023     Lab Results   Component Value Date    LDLCALC 99 01/05/2024    LDLCALC 95 10/26/2023     Lab Results   Component Value Date    TRIG 58 01/05/2024    TRIG 64 10/26/2023    TRIG 88 02/22/2023     Health Maintenance:   Foot Exam: February 20, 2024  Eye Exam: April 25, 2023  Urine Albumin: February 23, 2023    Assessment/Plan   63-year-old female presents for follow-up for type 2 diabetes. Her A1C is at goal.  Her blood pressure is at goal today.      She was found to have a 3 cm left thyroid nodule on a CT scan of her spine on December 6, 2022. She has no compressive symptoms. She is clinically euthyroid. She did undergo fine-needle aspiration biopsy on February 2, 2023. Cytology revealed a benign follicular nodule.    Type 2 diabetes mellitus with other specified complication, without long-term current use of insulin (CMS/[facility])  To discontinue Mounjaro SQ once weekly   To commence Rybelsus 3mg once daily for 30 days and then increase to 7mg once daily for 30 days and then 14mg once  daily   For a diabetic dilated eye examination  Please check blood sugars once daily and keep a detailed log  Please stick a low carb diet       Left thyroid nodule  To obtain a thyroid ultrasound     For follow up in 6 months

## 2024-02-20 NOTE — PATIENT INSTRUCTIONS
Thank you for choosing [facility][1] Endocrinology  for your health care needs.  If you have any questions, concerns or medical needs, please feel free to contact our office at [phone].    Please ensure you complete your blood work one week before the next scheduled appointment.    To discontinue Mounjaro SQ once weekly   To commence Rybelsus 3mg once daily for 30 days and then increase to 7mg once daily for 30 days and then 14mg once  daily   To obtain a thyroid ultrasound   For a diabetic dilated eye examination  Please check blood sugars once daily and keep a detailed log  Please stick a low carb diet   For follow up in 6 months

## 2024-02-21 ENCOUNTER — OFFICE VISIT (OUTPATIENT)
Dept: PRIMARY CARE | Facility: CLINIC | Age: 64
End: 2024-02-21
Payer: MEDICARE

## 2024-02-21 VITALS
HEART RATE: 68 BPM | BODY MASS INDEX: 26.84 KG/M2 | HEIGHT: 67 IN | OXYGEN SATURATION: 97 % | DIASTOLIC BLOOD PRESSURE: 66 MMHG | SYSTOLIC BLOOD PRESSURE: 122 MMHG | RESPIRATION RATE: 15 BRPM | WEIGHT: 171 LBS

## 2024-02-21 DIAGNOSIS — Z00.00 MEDICARE ANNUAL WELLNESS VISIT, SUBSEQUENT: Primary | ICD-10-CM

## 2024-02-21 DIAGNOSIS — T46.6X5A STATIN MYOPATHY: ICD-10-CM

## 2024-02-21 DIAGNOSIS — E78.5 DYSLIPIDEMIA: ICD-10-CM

## 2024-02-21 DIAGNOSIS — Z78.0 MENOPAUSE: ICD-10-CM

## 2024-02-21 DIAGNOSIS — G72.0 STATIN MYOPATHY: ICD-10-CM

## 2024-02-21 DIAGNOSIS — E66.09 CLASS 1 OBESITY DUE TO EXCESS CALORIES WITH SERIOUS COMORBIDITY AND BODY MASS INDEX (BMI) OF 31.0 TO 31.9 IN ADULT: ICD-10-CM

## 2024-02-21 DIAGNOSIS — I10 ESSENTIAL HYPERTENSION: ICD-10-CM

## 2024-02-21 DIAGNOSIS — F32.0 DEPRESSION, MAJOR, SINGLE EPISODE, MILD (CMS-HCC): ICD-10-CM

## 2024-02-21 DIAGNOSIS — E11.69 TYPE 2 DIABETES MELLITUS WITH OTHER SPECIFIED COMPLICATION, WITHOUT LONG-TERM CURRENT USE OF INSULIN (MULTI): ICD-10-CM

## 2024-02-21 DIAGNOSIS — Z12.11 ENCOUNTER FOR SCREENING FOR MALIGNANT NEOPLASM OF COLON: ICD-10-CM

## 2024-02-21 DIAGNOSIS — Z00.00 ANNUAL PHYSICAL EXAM: ICD-10-CM

## 2024-02-21 DIAGNOSIS — G36.0 NEUROMYELITIS OPTICA (MULTI): ICD-10-CM

## 2024-02-21 PROCEDURE — 3074F SYST BP LT 130 MM HG: CPT | Performed by: FAMILY MEDICINE

## 2024-02-21 PROCEDURE — 3078F DIAST BP <80 MM HG: CPT | Performed by: FAMILY MEDICINE

## 2024-02-21 PROCEDURE — G0439 PPPS, SUBSEQ VISIT: HCPCS | Performed by: FAMILY MEDICINE

## 2024-02-21 PROCEDURE — 3044F HG A1C LEVEL LT 7.0%: CPT | Performed by: FAMILY MEDICINE

## 2024-02-21 PROCEDURE — 99396 PREV VISIT EST AGE 40-64: CPT | Performed by: FAMILY MEDICINE

## 2024-02-21 PROCEDURE — 3008F BODY MASS INDEX DOCD: CPT | Performed by: FAMILY MEDICINE

## 2024-02-21 PROCEDURE — 3048F LDL-C <100 MG/DL: CPT | Performed by: FAMILY MEDICINE

## 2024-02-21 PROCEDURE — 1036F TOBACCO NON-USER: CPT | Performed by: FAMILY MEDICINE

## 2024-02-21 ASSESSMENT — PATIENT HEALTH QUESTIONNAIRE - PHQ9
SUM OF ALL RESPONSES TO PHQ9 QUESTIONS 1 AND 2: 0
2. FEELING DOWN, DEPRESSED OR HOPELESS: NOT AT ALL
1. LITTLE INTEREST OR PLEASURE IN DOING THINGS: NOT AT ALL

## 2024-02-21 ASSESSMENT — ACTIVITIES OF DAILY LIVING (ADL)
BATHING: INDEPENDENT
GROCERY_SHOPPING: INDEPENDENT
MANAGING_FINANCES: INDEPENDENT
DRESSING: INDEPENDENT
TAKING_MEDICATION: INDEPENDENT
DOING_HOUSEWORK: INDEPENDENT

## 2024-02-21 NOTE — ASSESSMENT & PLAN NOTE
Uncontrolled  Not at diabetic goal.  Long discussion regarding statin use.  Recommending coronary CT for further risk stratification, patient declines.  Continue diet and exercise changes

## 2024-02-21 NOTE — ASSESSMENT & PLAN NOTE
Reviewed all labs.  Schedule colonoscopy and bone density.  Mammogram up-to-date.  Reviewed ACP  Declines immunizations

## 2024-02-21 NOTE — PROGRESS NOTES
Subjective   Reason for Visit: Celeste Neely is an 63 y.o. female here for a Medicare Wellness visit.     Past Medical, Surgical, and Family History reviewed and updated in chart.    Reviewed all medications by prescribing practitioner or clinical pharmacist (such as prescriptions, OTCs, herbal therapies and supplements) and documented in the medical record.    Here for general check and wellness exams.    Last visit started statin for cholesterol goal  BP was elevated with   Home BP checks are much better [facility] machine  Denies chest pain, shortness of breath, lightheaded, dizziness or headaches.     Did see endo yesterday  Stopping mounjaro and starting rybelsius due to cost  A1C level is excellent  Hoping to get to Diet controlled DM  Seeing dentist  Due for eye exam, needs follow up for optic neuritis.  Checking thyroid US      Pap Hysterectomy  Mammogram 11/23  Colonoscopy 4/18 recheck in 3  Dexa due  Coronary CT  Flu - declined  Do not tell patient weight.  DECLINES STATIN  DECLINES Immunizations              Medicare Wellness Billing Compliance Satisfied    *This is a visual tool to show completion of required items on the day of the visit. Green checks will only appear on the date of visit.    Review all medications by prescribing practitioner or clinical pharmacist (such as prescriptions, OTCs, herbal therapies and supplements) documented in the medical record    Past Medical, Surgical, and Family History reviewed and updated in chart    Tobacco Use Reviewed    Alcohol Use Reviewed    Illicit Drug Use Reviewed    PHQ2/9    Falls in Last Year Reviewed    Home Safety Risk Factors Reviewed    Cognitive Impairment Reviewed    Patient Self Assessment and Health Status    Current Diet Reviewed    Exercise Frequency    ADL - Hearing Impairment    ADL - Bathing    ADL - Dressing    ADL - Walks in Home    IADL - Managing Finances    IADL - Grocery Shopping    IADL - Taking Medications    IADL -  "Doing Housework      Patient Care Team:  Katlin DUNBAR Brodie June DO as PCP - General (Family Medicine)  Katlin DUNBAR Brodie June DO as PCP - [employer] PCP     Review of Systems    Objective   Vitals:  /66   Pulse 68   Resp 15   Ht 1.689 m (5' 6.5\")   Wt 77.6 kg (171 lb)   SpO2 97%   BMI 27.19 kg/m²       Physical Exam  Vitals and nursing note reviewed.   Constitutional:       Appearance: Normal appearance.   HENT:      Head: Normocephalic.      Right Ear: Tympanic membrane normal.      Left Ear: Tympanic membrane normal.      Nose: Nose normal.      Mouth/Throat:      Mouth: Mucous membranes are dry.   Eyes:      Extraocular Movements: Extraocular movements intact.      Pupils: Pupils are equal, round, and reactive to light.   Cardiovascular:      Rate and Rhythm: Normal rate and regular rhythm.      Pulses: Normal pulses.   Pulmonary:      Effort: Pulmonary effort is normal.      Breath sounds: Normal breath sounds.   Abdominal:      General: Abdomen is flat. Bowel sounds are normal.      Palpations: Abdomen is soft.   Musculoskeletal:         General: Normal range of motion.      Cervical back: Normal range of motion.   Skin:     General: Skin is warm and dry.   Neurological:      General: No focal deficit present.      Mental Status: She is alert and oriented to person, place, and time.   Psychiatric:         Mood and Affect: Mood normal.         Behavior: Behavior normal.         Thought Content: Thought content normal.         Judgment: Judgment normal.         Assessment/Plan   Problem List Items Addressed This Visit       Depression, major, single episode, mild (CMS/HCC)    Dyslipidemia     Uncontrolled  Not at diabetic goal.  Long discussion regarding statin use.  Recommending coronary CT for further risk stratification, patient declines.  Continue diet and exercise changes         Essential hypertension     Improving.  Continue diet changes.  Continue weight loss efforts.  Refilling " meds at same dose.  Reviewed labs.  Recheck in 6 months         Neuromyelitis optica (CMS/[facility])     Stable  Follow-up as scheduled with ophthalmology         Type 2 diabetes mellitus with other specified complication, without long-term current use of insulin (CMS/[facility])     Improving.  A1c controlled.  Schedule eye exam  Continue care per endocrinology         Class 1 obesity due to excess calories with serious comorbidity and body mass index (BMI) of 31.0 to 31.9 in adult     Improving.  Continue weight loss efforts      Continue diet and exercise changes         Medicare annual wellness visit, subsequent - Primary     Reviewed all labs.  Schedule colonoscopy and bone density.  Mammogram up-to-date.  Reviewed ACP  Declines immunizations         Annual physical exam     Reviewed all labs.  Schedule colonoscopy and bone density.  Mammogram up-to-date.  Declines vaccines.  Continue diet and exercise and weight loss efforts         Statin myopathy     Intolerable to statins.  Continue to monitor cholesterol          Other Visit Diagnoses       Menopause        Relevant Orders    XR DEXA bone density    Encounter for screening for malignant neoplasm of colon        Relevant Orders    Colonoscopy Screening; Average Risk Patient

## 2024-02-21 NOTE — ASSESSMENT & PLAN NOTE
Improving.  Continue diet changes.  Continue weight loss efforts.  Refilling meds at same dose.  Reviewed labs.  Recheck in 6 months

## 2024-02-21 NOTE — ASSESSMENT & PLAN NOTE
Reviewed all labs.  Schedule colonoscopy and bone density.  Mammogram up-to-date.  Declines vaccines.  Continue diet and exercise and weight loss efforts

## 2024-02-23 ASSESSMENT — ENCOUNTER SYMPTOMS: SHORTNESS OF BREATH: 0

## 2024-02-23 NOTE — ASSESSMENT & PLAN NOTE
To discontinue Mounjaro SQ once weekly   To commence Rybelsus 3mg once daily for 30 days and then increase to 7mg once daily for 30 days and then 14mg once  daily   For a diabetic dilated eye examination  Please check blood sugars once daily and keep a detailed log  Please stick a low carb diet

## 2024-02-29 ENCOUNTER — APPOINTMENT (OUTPATIENT)
Dept: RADIOLOGY | Facility: HOSPITAL | Age: 64
End: 2024-02-29
Payer: MEDICARE

## 2024-03-01 ENCOUNTER — HOSPITAL ENCOUNTER (OUTPATIENT)
Dept: RADIOLOGY | Facility: HOSPITAL | Age: 64
Discharge: HOME | End: 2024-03-01
Payer: MEDICARE

## 2024-03-01 PROCEDURE — 76536 US EXAM OF HEAD AND NECK: CPT | Performed by: RADIOLOGY

## 2024-03-01 PROCEDURE — 76536 US EXAM OF HEAD AND NECK: CPT

## 2024-03-04 ENCOUNTER — TELEPHONE (OUTPATIENT)
Dept: GASTROENTEROLOGY | Facility: CLINIC | Age: 64
End: 2024-03-04
Payer: MEDICARE

## 2024-03-04 NOTE — TELEPHONE ENCOUNTER
----- Message from Una Gordon MA sent at 3/4/2024  1:39 PM EST -----  Regarding: RE: Colonoscopy screening  [facility] COLONOSCOPY 3.28.24  ENDO  DR. GABRIEL DONALD  PACKET MAILED 3.4.24  ----- Message -----  From: Ama Velasco RN  Sent: 3/1/2024   2:41 PM EST  To: [id number]  Subject: Colonoscopy screening                            Open access

## 2024-03-27 ENCOUNTER — ANESTHESIA EVENT (OUTPATIENT)
Dept: GASTROENTEROLOGY | Facility: HOSPITAL | Age: 64
End: 2024-03-27
Payer: MEDICARE

## 2024-03-28 ENCOUNTER — ANESTHESIA (OUTPATIENT)
Dept: GASTROENTEROLOGY | Facility: HOSPITAL | Age: 64
End: 2024-03-28
Payer: MEDICARE

## 2024-03-28 ENCOUNTER — HOSPITAL ENCOUNTER (OUTPATIENT)
Dept: GASTROENTEROLOGY | Facility: HOSPITAL | Age: 64
Discharge: HOME | End: 2024-03-28
Payer: MEDICARE

## 2024-03-28 VITALS
TEMPERATURE: 97.2 F | HEART RATE: 56 BPM | BODY MASS INDEX: 27.48 KG/M2 | SYSTOLIC BLOOD PRESSURE: 133 MMHG | RESPIRATION RATE: 14 BRPM | WEIGHT: 171 LBS | HEIGHT: 66 IN | DIASTOLIC BLOOD PRESSURE: 78 MMHG | OXYGEN SATURATION: 95 %

## 2024-03-28 DIAGNOSIS — Z12.11 ENCOUNTER FOR SCREENING FOR MALIGNANT NEOPLASM OF COLON: ICD-10-CM

## 2024-03-28 PROCEDURE — 2500000004 HC RX 250 GENERAL PHARMACY W/ HCPCS (ALT 636 FOR OP/ED): Performed by: NURSE ANESTHETIST, CERTIFIED REGISTERED

## 2024-03-28 PROCEDURE — 3700000001 HC GENERAL ANESTHESIA TIME - INITIAL BASE CHARGE

## 2024-03-28 PROCEDURE — 7100000010 HC PHASE TWO TIME - EACH INCREMENTAL 1 MINUTE

## 2024-03-28 PROCEDURE — 2500000004 HC RX 250 GENERAL PHARMACY W/ HCPCS (ALT 636 FOR OP/ED): Performed by: INTERNAL MEDICINE

## 2024-03-28 PROCEDURE — G0121 COLON CA SCRN NOT HI RSK IND: HCPCS | Performed by: INTERNAL MEDICINE

## 2024-03-28 PROCEDURE — 2500000005 HC RX 250 GENERAL PHARMACY W/O HCPCS: Performed by: NURSE ANESTHETIST, CERTIFIED REGISTERED

## 2024-03-28 PROCEDURE — 45378 DIAGNOSTIC COLONOSCOPY: CPT | Performed by: INTERNAL MEDICINE

## 2024-03-28 PROCEDURE — 7100000009 HC PHASE TWO TIME - INITIAL BASE CHARGE

## 2024-03-28 PROCEDURE — 3700000002 HC GENERAL ANESTHESIA TIME - EACH INCREMENTAL 1 MINUTE

## 2024-03-28 RX ORDER — LIDOCAINE HYDROCHLORIDE 20 MG/ML
INJECTION, SOLUTION EPIDURAL; INFILTRATION; INTRACAUDAL; PERINEURAL AS NEEDED
Status: DISCONTINUED | OUTPATIENT
Start: 2024-03-28 | End: 2024-03-28

## 2024-03-28 RX ORDER — PROPOFOL 10 MG/ML
INJECTION, EMULSION INTRAVENOUS AS NEEDED
Status: DISCONTINUED | OUTPATIENT
Start: 2024-03-28 | End: 2024-03-28

## 2024-03-28 RX ORDER — FENTANYL CITRATE 50 UG/ML
INJECTION, SOLUTION INTRAMUSCULAR; INTRAVENOUS AS NEEDED
Status: DISCONTINUED | OUTPATIENT
Start: 2024-03-28 | End: 2024-03-28

## 2024-03-28 RX ORDER — SODIUM CHLORIDE 9 MG/ML
30 INJECTION, SOLUTION INTRAVENOUS CONTINUOUS
Status: DISCONTINUED | OUTPATIENT
Start: 2024-03-28 | End: 2024-03-29 | Stop reason: HOSPADM

## 2024-03-28 RX ADMIN — FENTANYL CITRATE 25 MCG: 50 INJECTION INTRAMUSCULAR; INTRAVENOUS at 08:42

## 2024-03-28 RX ADMIN — FENTANYL CITRATE 50 MCG: 50 INJECTION INTRAMUSCULAR; INTRAVENOUS at 08:37

## 2024-03-28 RX ADMIN — LIDOCAINE HYDROCHLORIDE 40 MG: 20 INJECTION, SOLUTION EPIDURAL; INFILTRATION; INTRACAUDAL; PERINEURAL at 08:37

## 2024-03-28 RX ADMIN — PROPOFOL 50 MG: 10 INJECTION, EMULSION INTRAVENOUS at 08:48

## 2024-03-28 RX ADMIN — PROPOFOL 100 MG: 10 INJECTION, EMULSION INTRAVENOUS at 08:37

## 2024-03-28 RX ADMIN — SODIUM CHLORIDE: 9 INJECTION, SOLUTION INTRAVENOUS at 08:30

## 2024-03-28 RX ADMIN — PROPOFOL 50 MG: 10 INJECTION, EMULSION INTRAVENOUS at 08:42

## 2024-03-28 RX ADMIN — FENTANYL CITRATE 25 MCG: 50 INJECTION INTRAMUSCULAR; INTRAVENOUS at 08:48

## 2024-03-28 SDOH — HEALTH STABILITY: MENTAL HEALTH: CURRENT SMOKER: 0

## 2024-03-28 ASSESSMENT — COLUMBIA-SUICIDE SEVERITY RATING SCALE - C-SSRS
1. IN THE PAST MONTH, HAVE YOU WISHED YOU WERE DEAD OR WISHED YOU COULD GO TO SLEEP AND NOT WAKE UP?: NO
2. HAVE YOU ACTUALLY HAD ANY THOUGHTS OF KILLING YOURSELF?: NO
6. HAVE YOU EVER DONE ANYTHING, STARTED TO DO ANYTHING, OR PREPARED TO DO ANYTHING TO END YOUR LIFE?: NO

## 2024-03-28 ASSESSMENT — PAIN - FUNCTIONAL ASSESSMENT
PAIN_FUNCTIONAL_ASSESSMENT: 0-10

## 2024-03-28 ASSESSMENT — PAIN SCALES - GENERAL
PAIN_LEVEL: 0
PAINLEVEL_OUTOF10: 0 - NO PAIN

## 2024-03-28 NOTE — ANESTHESIA PREPROCEDURE EVALUATION
Patient: Celeste Neely    Procedure Information       Date/Time: 03/28/24 0800    Scheduled providers: Jose York MD    Procedure: COLONOSCOPY    Location: [facility]            Relevant Problems   Anesthesia (within normal limits)      Cardiac   (+) Essential hypertension      Pulmonary (within normal limits)      Neuro   (+) Anxiety   (+) Depression, major, single episode, mild (CMS/HCC)      GI (within normal limits)      /Renal (within normal limits)      Liver (within normal limits)      Endocrine   (+) Class 1 obesity due to excess calories with serious comorbidity and body mass index (BMI) of 31.0 to 31.9 in adult   (+) Type 2 diabetes mellitus with other specified complication, without long-term current use of insulin (CMS/HCC)      Hematology (within normal limits)      HEENT   (+) Asymmetric SNHL (sensorineural hearing loss)   (+) Conductive hearing loss, bilateral       Clinical information reviewed:   Tobacco  Allergies  Meds   Med Hx  Surg Hx   Fam Hx  Soc Hx        NPO Detail:  NPO/Void Status  Date of Last Liquid: 03/27/24  Time of Last Liquid: 2230  Date of Last Solid: 03/26/24  Last Intake Type: Clear fluids         Physical Exam    Airway  Mallampati: II  TM distance: >3 FB  Neck ROM: full     Cardiovascular - normal exam  Rhythm: regular     Dental - normal exam     Pulmonary - normal exam     Abdominal - normal exam         Anesthesia Plan    History of general anesthesia?: yes  History of complications of general anesthesia?: no    ASA 2     MAC     The patient is not a current smoker.    intravenous induction   Anesthetic plan and risks discussed with patient.

## 2024-03-28 NOTE — ANESTHESIA POSTPROCEDURE EVALUATION
Patient: Celeste Neely    Procedure Summary       Date: 03/28/24 Room / Location: [facility]    Anesthesia Start: 0830 Anesthesia Stop: 0858    Procedure: COLONOSCOPY Diagnosis: Encounter for screening for malignant neoplasm of colon    Scheduled Providers: Jose York MD Responsible Provider: CHRISTIANO Melendez    Anesthesia Type: MAC ASA Status: 2            Anesthesia Type: MAC    Vitals Value Taken Time   /66 03/28/24 0859   Temp 36.3 °C (97.4 °F) 03/28/24 0859   Pulse 70 03/28/24 0859   Resp 16 03/28/24 0859   SpO2 92 % 03/28/24 0859       Anesthesia Post Evaluation    Patient location during evaluation: bedside  Patient participation: complete - patient participated  Level of consciousness: awake and alert  Pain score: 0  Pain management: adequate  Airway patency: patent  Cardiovascular status: acceptable  Respiratory status: acceptable  Hydration status: acceptable  Postoperative Nausea and Vomiting: none    There were no known notable events for this encounter.

## 2024-03-28 NOTE — H&P
History Of Present Illness  Celeste Neely is a 63 y.o. female presenting for colon polyp surveillance examination.  At colonoscopy 10 years ago a few polyps were removed.  Her family history is notable for colon cancer in her father who was diagnosed in his 60s.  She is asymptomatic.     Past Medical History  She has a past medical history of Anxiety disorder, unspecified (06/12/2014), Neuromyelitis optica (devic) (CMS/[facility]) (01/06/2020), Personal history of other diseases of the circulatory system, and Type 2 diabetes mellitus without complications (CMS/[facility]) (10/09/2022).    Surgical History  She has a past surgical history that includes Hysterectomy (06/12/2014).     Social History  She reports that she has never smoked. She has never been exposed to tobacco smoke. She has never used smokeless tobacco. She reports that she does not currently use alcohol. She reports that she does not currently use drugs.    Family History  Family History   Problem Relation Name Age of Onset    Colon cancer Father          Allergies  Penicillins    Review of Systems  Constitutional: no fever, no chills, fatigue,  not feeling tired and no recent weight loss. No reports of dizziness.  SKIN: No skin rash or lesions  EYE: No pain photophobia, diplopia or blurred vision  EAR: No discharge, pain or hearing loss  NOSE: No congestion, epistaxis or obstruction  RESPIRATORY: No cough , dyspnea or  chest pain   CV: No chest pain, lower extremity swelling or palpitations  GE: No abdominal pain, nausea, vomiting, dysphagia or odynophagia. Denied constipation , diarrhea  : No dysuria or hematuria, urinary incontinence or urine frequency  NEURO: Denied weakness, confusion, dizziness, or numbness   PSYCH : Denies anxiety, hallucinations or insomnia  HEME/ LYMPH : Denied bleeding , bruising or enlarged nodes  ENDOCRINE: No change in weight, polydipsia, or abnormal bleeding  .     Physical Exam  Head and neck examinations were  unremarkable. There  "was no temporal wasting. No jaundice noted. No thyromegaly.  No carotid bruits.  There is no peripheral lymphadenopathy.  Lungs were clear to auscultation. There when no rales, rhonchi or wheezes.  Cardiac examination revealed normal heart sounds. Rhythm was sinus . There were no murmurs.  Abdomen was full and soft. There was no organomegaly. Bowel sounds were normo- active. There was no ascites. The abdomen was nontender.  Rectal examination was not done.  Extremities: No edema or joint swelling.  Neurological examination: Patient was alert, oriented in person place, and time. There when no focal neurological signs. Cranial nerves were grossly intact. No evidence of encephalopathy. There was no asterixis. The  plantar response was flexor.    Last Recorded Vitals  Blood pressure 148/85, pulse 63, temperature 36.1 °C (97 °F), temperature source Temporal, resp. rate 16, height 1.676 m (5' 6\"), weight 77.6 kg (171 lb), SpO2 100 %.    Relevant Results             Assessment/Plan  63-year-old lady presenting for colon polyp surveillance examination.  She is asymptomatic.  Family history is positive for colon cancer in her father.  Proceed with colonoscopy today as planned.  Jose York MD  "

## 2024-03-31 NOTE — RESULT ENCOUNTER NOTE
Thyroid ultrasound has been reviewed.  There is slight enlargement of the left thyroid nodule.  This was previously biopsied and found to be benign.  For follow up as scheduled.

## 2024-05-01 ENCOUNTER — TELEPHONE (OUTPATIENT)
Dept: ENDOCRINOLOGY | Facility: CLINIC | Age: 64
End: 2024-05-01
Payer: MEDICARE

## 2024-05-01 NOTE — TELEPHONE ENCOUNTER
Patient state she doesn't feel the rybelsus 7mg is controlling her blood sugars, she state sugars has been around 150 for about a week.

## 2024-05-06 DIAGNOSIS — E11.9 TYPE 2 DIABETES MELLITUS WITHOUT COMPLICATION, WITHOUT LONG-TERM CURRENT USE OF INSULIN (MULTI): ICD-10-CM

## 2024-07-01 DIAGNOSIS — I10 ESSENTIAL HYPERTENSION: ICD-10-CM

## 2024-07-03 RX ORDER — HYDROCHLOROTHIAZIDE 25 MG/1
25 TABLET ORAL DAILY
Qty: 90 TABLET | Refills: 0 | Status: SHIPPED | OUTPATIENT
Start: 2024-07-03

## 2024-08-08 DIAGNOSIS — F32.A DEPRESSION, UNSPECIFIED: ICD-10-CM

## 2024-08-09 RX ORDER — ESCITALOPRAM OXALATE 20 MG/1
20 TABLET ORAL DAILY
Qty: 90 TABLET | Refills: 0 | Status: SHIPPED | OUTPATIENT
Start: 2024-08-09

## 2024-08-19 ENCOUNTER — LAB (OUTPATIENT)
Dept: LAB | Facility: LAB | Age: 64
End: 2024-08-19
Payer: MEDICARE

## 2024-08-19 DIAGNOSIS — E11.69 TYPE 2 DIABETES MELLITUS WITH OTHER SPECIFIED COMPLICATION, WITHOUT LONG-TERM CURRENT USE OF INSULIN (MULTI): ICD-10-CM

## 2024-08-19 LAB
EST. AVERAGE GLUCOSE BLD GHB EST-MCNC: 143 MG/DL
HBA1C MFR BLD: 6.6 %

## 2024-08-19 PROCEDURE — 36415 COLL VENOUS BLD VENIPUNCTURE: CPT

## 2024-08-19 PROCEDURE — 83036 HEMOGLOBIN GLYCOSYLATED A1C: CPT

## 2024-08-20 ENCOUNTER — APPOINTMENT (OUTPATIENT)
Dept: ENDOCRINOLOGY | Facility: CLINIC | Age: 64
End: 2024-08-20
Payer: MEDICARE

## 2024-08-20 VITALS
BODY MASS INDEX: 27.8 KG/M2 | DIASTOLIC BLOOD PRESSURE: 64 MMHG | HEIGHT: 66 IN | WEIGHT: 173 LBS | HEART RATE: 66 BPM | SYSTOLIC BLOOD PRESSURE: 108 MMHG

## 2024-08-20 DIAGNOSIS — E04.1 LEFT THYROID NODULE: ICD-10-CM

## 2024-08-20 DIAGNOSIS — E11.9 TYPE 2 DIABETES MELLITUS WITHOUT COMPLICATION, WITHOUT LONG-TERM CURRENT USE OF INSULIN (MULTI): Primary | ICD-10-CM

## 2024-08-20 PROCEDURE — G2211 COMPLEX E/M VISIT ADD ON: HCPCS | Performed by: INTERNAL MEDICINE

## 2024-08-20 PROCEDURE — 3078F DIAST BP <80 MM HG: CPT | Performed by: INTERNAL MEDICINE

## 2024-08-20 PROCEDURE — 3074F SYST BP LT 130 MM HG: CPT | Performed by: INTERNAL MEDICINE

## 2024-08-20 PROCEDURE — 3008F BODY MASS INDEX DOCD: CPT | Performed by: INTERNAL MEDICINE

## 2024-08-20 PROCEDURE — 3048F LDL-C <100 MG/DL: CPT | Performed by: INTERNAL MEDICINE

## 2024-08-20 PROCEDURE — 3044F HG A1C LEVEL LT 7.0%: CPT | Performed by: INTERNAL MEDICINE

## 2024-08-20 PROCEDURE — 99214 OFFICE O/P EST MOD 30 MIN: CPT | Performed by: INTERNAL MEDICINE

## 2024-08-20 ASSESSMENT — ENCOUNTER SYMPTOMS: BACK PAIN: 1

## 2024-08-20 NOTE — PATIENT INSTRUCTIONS
Thank you for choosing [facility]  for your health care needs.  If you have any questions, concerns or medical needs, please feel free to contact our office at [phone].    Please ensure you complete your blood work one week before the next scheduled appointment.    To continue Rybelsus 14mg once daily  To look into cost of Farxiga 10mg once daily   To see if wegovy is covered and at what cost   Pioglitazone is generic bu this leads to fluid retention and weight gain   Please check blood sugars once daily and keep a detailed log  Please stick a low carb diet   To obtain a thyroid ultrasound in March 2025  For follow up in 6 months

## 2024-08-20 NOTE — PROGRESS NOTES
Subjective   Celeste Neely is a 63 y.o. female who presents for follow-up of Type 2 diabetes mellitus. The initial diagnosis of diabetes was made in 2010 .   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 She subsequently lost the weight and her hemoglobin A1c improved to 5.2%. She was taking off medications. In 2017 her disease flared up again and she was restarted on glucocorticoid for one year. She was admitted in April 2023 for neuromyelitis optica. She is no longer on Prednisone.    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   She has been on disability for 7 years.  She will be looking into [place] program which provides a ticket to work.      She has had a burning sensation to her lower back.  She previously experienced something similar in 2015.      Known complications due to diabetes included none    Cardiovascular risk factors include diabetes mellitus. The patient is not on an ACE inhibitor or angiotensin II receptor blocker.  The patient has not been previously hospitalized due to diabetic ketoacidosis.     Current symptoms/problems include none. Her clinical course has been stable.     The patient is currently checking the blood glucose.    Patient is using: glucometer    Hypoglycemia frequency: Denies   Hypoglycemia awareness: N/A     Previous medications tried:  Metformin  Glipizide  She has no desire to go back on any of these agents.      She did have a CT scan of her neck on December 6, 2022 which revealed a 3 cm lesion to the left thyroid gland.     Thyroid ultrasound was obtained on December 12, 2022 which revealed a moderately suspicious left thyroid nodule.     She underwent a fine-needle aspiration biopsy on February 2, 2023. Cytology revealed a benign follicular nodule.     Current Medication Regimen  Rybelsus 14mg every other day due to cost        MEALS:  unchanged eating habits despite lack of     Exercise regimen - denies  "as this triggers flares of NMO     Review of Systems   Musculoskeletal:  Positive for back pain.   Neurological:         Tingling    All other systems reviewed and are negative.      Objective   /64 (BP Location: Left arm, Patient Position: Sitting, BP Cuff Size: Adult)   Pulse 66   Ht 1.676 m (5' 6\")   Wt 78.5 kg (173 lb)   BMI 27.92 kg/m²   Physical Exam  Vitals and nursing note reviewed.   Constitutional:       General: She is not in acute distress.     Appearance: Normal appearance. She is normal weight.   HENT:      Head: Normocephalic and atraumatic.      Nose: Nose normal.      Mouth/Throat:      Mouth: Mucous membranes are moist.   Eyes:      Extraocular Movements: Extraocular movements intact.   Neck:      Thyroid: Thyroid mass (Left side nodule) present. No thyroid tenderness.   Cardiovascular:      Rate and Rhythm: Normal rate and regular rhythm.   Pulmonary:      Effort: Pulmonary effort is normal.      Breath sounds: Normal breath sounds.   Musculoskeletal:         General: Normal range of motion.      Right foot: Normal range of motion. No deformity, bunion or Charcot foot.      Left foot: Normal range of motion. No deformity, bunion or Charcot foot.   Feet:      Right foot:      Skin integrity: Skin integrity normal. No ulcer, blister, skin breakdown or erythema.      Toenail Condition: Right toenails are normal.      Left foot:      Skin integrity: Skin integrity normal. No ulcer, blister, skin breakdown or erythema.      Toenail Condition: Left toenails are normal.   Skin:     General: Skin is warm.   Neurological:      Mental Status: She is alert and oriented to person, place, and time.   Psychiatric:         Mood and Affect: Mood normal.         Lab Review  Glucose (mg/dL)   Date Value   01/05/2024 101 (H)   10/26/2023 113 (H)   08/16/2023 140 (H)   06/21/2023 105 (H)   04/04/2023 242 (H)     Hemoglobin A1C (%)   Date Value   08/19/2024 6.6 (H)   01/03/2024 5.9 (H)   10/26/2023 6.1 (H) "     Bicarbonate (mmol/L)   Date Value   01/05/2024 31   10/26/2023 28   08/16/2023 29   06/21/2023 27   04/04/2023 20 (L)     Urea Nitrogen (mg/dL)   Date Value   01/05/2024 8   10/26/2023 7   08/16/2023 6   06/21/2023 8   04/04/2023 17     Creatinine (mg/dL)   Date Value   01/05/2024 0.53   10/26/2023 0.51   08/16/2023 0.50   06/21/2023 0.62   04/04/2023 0.70     Lab Results   Component Value Date    CHOL 152 01/05/2024    CHOL 148 10/26/2023    CHOL 181 02/22/2023     Lab Results   Component Value Date    HDL 41.7 01/05/2024    HDL 39.9 10/26/2023    HDL 37.7 (A) 02/22/2023     Lab Results   Component Value Date    LDLCALC 99 01/05/2024    LDLCALC 95 10/26/2023     Lab Results   Component Value Date    TRIG 58 01/05/2024    TRIG 64 10/26/2023    TRIG 88 02/22/2023     Lab Results   Component Value Date    TSH 1.71 01/12/2024    THYROIDPAB 24 12/11/2018       Health Maintenance:   Foot Exam: August 20, 2024  Eye Exam: April 25, 2023  Urine Albumin: February 23, 2023    Assessment/Plan   63-year-old female presents for follow-up for type 2 diabetes. Her A1C is at goal.  We did elevated compared to 6 months ago.  Her blood pressure is at goal today.      She was found to have a 3 cm left thyroid nodule on a CT scan of her spine on December 6, 2022. She has no compressive symptoms. She is clinically euthyroid. She did undergo fine-needle aspiration biopsy on February 2, 2023. Cytology revealed a benign follicular nodule.    Type 2 diabetes mellitus without complication, without long-term current use of insulin (Multi)  To continue Rybelsus 14mg once daily  To look into cost of Farxiga 10mg once daily   To see if wegovy is covered and at what cost   Pioglitazone is generic but this leads to fluid retention and weight gain   Please check blood sugars once daily and keep a detailed log  Please stick a low carb diet   The A1c is still at goal  Please keep feet moisturized and inspect daily      Left thyroid nodule  To  obtain a thyroid ultrasound in March 2025    For follow up in 6 months

## 2024-08-21 ENCOUNTER — TELEPHONE (OUTPATIENT)
Dept: ENDOCRINOLOGY | Facility: CLINIC | Age: 64
End: 2024-08-21

## 2024-08-21 ENCOUNTER — APPOINTMENT (OUTPATIENT)
Dept: PRIMARY CARE | Facility: CLINIC | Age: 64
End: 2024-08-21
Payer: MEDICARE

## 2024-08-21 DIAGNOSIS — E04.1 LEFT THYROID NODULE: ICD-10-CM

## 2024-08-21 DIAGNOSIS — E11.9 TYPE 2 DIABETES MELLITUS WITHOUT COMPLICATION, WITHOUT LONG-TERM CURRENT USE OF INSULIN (MULTI): Primary | ICD-10-CM

## 2024-08-21 NOTE — TELEPHONE ENCOUNTER
Patient wanted to inform our office that she called her insurance and was told that it has been 6 months since tier exception was submitted for mounjaro and that it can be done again. Patient prefers to do that instead of using wegovy. Patient states that she initiated the request with insurance rep and provided our contact information. They are to contact our office to complete request.

## 2024-08-21 NOTE — ASSESSMENT & PLAN NOTE
To continue Rybelsus 14mg once daily  To look into cost of Farxiga 10mg once daily   To see if wegovy is covered and at what cost   Pioglitazone is generic but this leads to fluid retention and weight gain   Please check blood sugars once daily and keep a detailed log  Please stick a low carb diet   The A1c is still at goal  Please keep feet moisturized and inspect daily

## 2024-08-22 NOTE — TELEPHONE ENCOUNTER
PA for wegovy received from insurance and has been scanned to chart as wegovy has not been prescribed by provider yet. Patient has been contacted for clarification; awaiting call back.

## 2024-08-22 NOTE — TELEPHONE ENCOUNTER
Denial for tier exception for mounjaro received. Voicemail left for patient requesting call back. Patient is to contact insurance to obtain preferred brand within tier that is cost effective for her.

## 2024-08-22 NOTE — TELEPHONE ENCOUNTER
Agent from patient's insurance called to initiate an authorization for Wegovy.  Looking at previous message, patient had indicated that an agent would contact the office regarding Mounjaro.  Wegovy has not been prescribed by Dr. Kim yet, as patient had advise the office she preferred the Mounjaro. Agent did not have this documented on her request and there was no mention in the notes to discuss Mounjaro.   A message had previously been left for patient to contact the office.  Please clarify what patient is requesting when she returns our call.

## 2024-08-26 ENCOUNTER — TELEPHONE (OUTPATIENT)
Dept: ENDOCRINOLOGY | Facility: CLINIC | Age: 64
End: 2024-08-26
Payer: MEDICARE

## 2024-08-26 NOTE — TELEPHONE ENCOUNTER
Wegovy denied due to no dx of cardiovascular diagnosis     See attached

## 2024-08-28 NOTE — TELEPHONE ENCOUNTER
Patient called to advise that her insurance company is awaiting information from our office in regards to Wegovy coverage.  I see a document from her insurance scanned to patient's chart that is not completed.  Per previous message, Wegovy had not yet been prescribed by Dr. Kim.  It was recommended at patient's appointment that she check on coverage on this medication.  I am unsure how a PA was initiated under Dr. Kim' name from her insurance without a prescription being sent to pharmacy.    I called to speak with Celeste to explain that we need to send the request to Dr. Kim for the medication to be sent in before an official prior authorization can be obtained with her insurance.  She has asked that we send in Wegovy to [facility] in [place] for her.  Message to Dr. Kim for new Wegovy RX.   - see message started on 8/26/24 for Wegovy denial for further documentation.

## 2024-08-28 NOTE — TELEPHONE ENCOUNTER
Patient called to advise that her insurance company is awaiting information from our office in regards to Wegovy coverage.  I see a document from her insurance scanned to patient's chart that is not completed.  Per previous message, Wegovy had not yet been prescribed by Dr. Kim.  It was recommended at patient's appointment that she check on coverage on this medication.  I am unsure how a PA was initiated under Dr. Kim' name from her insurance without a prescription being sent to pharmacy.    I called to speak with Celeste to explain that we need to send the request to Dr. Kim for the medication to be sent in before an official prior authorization can be obtained with her insurance.  She has asked that we send in Wegovy to [facility] in [place] for her.  Message to Dr. Kim for new Wegovy RX.

## 2024-08-30 NOTE — TELEPHONE ENCOUNTER
Patient called to see if the Wegovy was sent to the pharmacy.  She states that she was going to hold off on filling the Rybelsus if she would be able to start the Wegovy.

## 2024-08-30 NOTE — TELEPHONE ENCOUNTER
Patient had called the office and left a message for me to return her call.  I called back and got her voicemail.  Message left to contact the office back.

## 2024-09-09 NOTE — TELEPHONE ENCOUNTER
Patient stopped at the office to check on the status of the message.    She has provided glucose readings.  All glucose readings are fasting AM.  9/2/24- 206  9/3/  9/4/  9/5/  9/6/  9/7/  9/8/  9/9/  She states that she previously had taken oral diabetic medications and was changed to Mounjaro.  That worked well for her but she could not afford it.  She has been waiting to see if you would send the Wegovy in for her (for weight reduction) but has not heard anything from the office to advise if it was going to be sent in. She would like a call back from the doctor soon to discuss her options. She does not feel that the Rybelsus is effective.

## 2024-09-10 NOTE — TELEPHONE ENCOUNTER
Rx sent to pharmacy on file    To commence Wegovy as follows:  Month 1: 0.25 mg once a week.  Month 2: 0.5 mg once a week.  Month 3: 1 mg once a week.  Month 4: 1.7 mg once a week.  Month 5 and beyond: 2.4 mg once a week (maintenance dose)    This medication has been on national backorder and thus its use can be quite disrupted with the titration schedule listed above

## 2024-09-30 DIAGNOSIS — E11.65 HYPERGLYCEMIA DUE TO DIABETES MELLITUS (MULTI): ICD-10-CM

## 2024-09-30 DIAGNOSIS — E11.65 TYPE 2 DIABETES MELLITUS WITH HYPERGLYCEMIA, WITHOUT LONG-TERM CURRENT USE OF INSULIN: ICD-10-CM

## 2024-09-30 DIAGNOSIS — R73.9 HYPERGLYCEMIA: ICD-10-CM

## 2024-09-30 DIAGNOSIS — Z00.00 HEALTH CARE MAINTENANCE: Primary | ICD-10-CM

## 2024-09-30 RX ORDER — INSULIN PUMP SYRINGE, 3 ML
1 EACH MISCELLANEOUS AS NEEDED
COMMUNITY

## 2024-09-30 RX ORDER — INSULIN PUMP SYRINGE, 3 ML
1 EACH MISCELLANEOUS AS NEEDED
COMMUNITY
End: 2024-09-30 | Stop reason: SDUPTHER

## 2024-09-30 NOTE — TELEPHONE ENCOUNTER
(Next appt 2/25/2025)    Patient will need one touch ultra 2 meter, and test strips sent to [facility] pharmacy

## 2024-10-01 RX ORDER — BLOOD SUGAR DIAGNOSTIC
1 STRIP MISCELLANEOUS DAILY
Qty: 100 STRIP | Refills: 3 | Status: SHIPPED | OUTPATIENT
Start: 2024-10-01 | End: 2025-10-01

## 2024-10-01 RX ORDER — INSULIN PUMP SYRINGE, 3 ML
1 EACH MISCELLANEOUS DAILY
Qty: 1 EACH | Refills: 0 | Status: SHIPPED | OUTPATIENT
Start: 2024-10-01 | End: 2025-10-01

## 2024-10-09 ENCOUNTER — TELEPHONE (OUTPATIENT)
Dept: ENDOCRINOLOGY | Facility: CLINIC | Age: 64
End: 2024-10-09
Payer: MEDICARE

## 2024-10-09 NOTE — TELEPHONE ENCOUNTER
"Patient called to advise that her insurance will not cover Wegovy and the Rybelsus is too expensive due to her being \"in the donut hole\".      At this point, she feels that her only option would be to go back on Metformin and Glipizide.  She does not have any medication for diabetes and would need this soon.  She uses [facility] pharmacy.     "

## 2024-10-10 DIAGNOSIS — I10 ESSENTIAL HYPERTENSION: ICD-10-CM

## 2024-10-11 NOTE — TELEPHONE ENCOUNTER
Patient would like to know if she can have the requested medications prescribed so she is not without medication to treat her diabetes.

## 2024-10-15 DIAGNOSIS — E11.9 TYPE 2 DIABETES MELLITUS WITHOUT COMPLICATION, WITHOUT LONG-TERM CURRENT USE OF INSULIN (MULTI): Primary | ICD-10-CM

## 2024-10-15 RX ORDER — GLIPIZIDE 10 MG/1
10 TABLET, FILM COATED, EXTENDED RELEASE ORAL
Qty: 90 TABLET | Refills: 1 | Status: SHIPPED | OUTPATIENT
Start: 2024-10-15 | End: 2025-04-13

## 2024-10-15 RX ORDER — METFORMIN HYDROCHLORIDE 500 MG/1
500 TABLET, EXTENDED RELEASE ORAL
Qty: 180 TABLET | Refills: 1 | Status: SHIPPED | OUTPATIENT
Start: 2024-10-15 | End: 2025-04-13

## 2024-10-15 NOTE — TELEPHONE ENCOUNTER
Patient called to check status on message, patient state she has not been on any Diabetic Medications in 2 weeks now!

## 2024-11-25 ENCOUNTER — TELEPHONE (OUTPATIENT)
Dept: PRIMARY CARE | Facility: CLINIC | Age: 64
End: 2024-11-25
Payer: MEDICARE

## 2024-11-25 NOTE — TELEPHONE ENCOUNTER
Patient asking for an appointment to see you for gastro issues related to the Metformin rhe is on, I advised her to see Dr.C sutherland, she declined and wants to see  you before you leave.     Medication Name: AMLODIPINE   Dose:10MG   Frequency: 1 TAB DAILY    Quantity left: NONE   [facility]     Last appointment: 2/24   Last CPE:  Last MCW:  Next appointment:  Next CPE:  Next MCW:

## 2024-11-27 ENCOUNTER — LAB (OUTPATIENT)
Dept: LAB | Facility: LAB | Age: 64
End: 2024-11-27
Payer: MEDICARE

## 2024-11-27 DIAGNOSIS — I10 ESSENTIAL HYPERTENSION: ICD-10-CM

## 2024-11-27 DIAGNOSIS — F32.A DEPRESSION, UNSPECIFIED: ICD-10-CM

## 2024-11-27 RX ORDER — AMLODIPINE BESYLATE 10 MG/1
10 TABLET ORAL DAILY
Qty: 90 TABLET | Refills: 0 | Status: SHIPPED | OUTPATIENT
Start: 2024-11-27

## 2024-12-09 DIAGNOSIS — F41.9 ANXIETY DISORDER, UNSPECIFIED: ICD-10-CM

## 2024-12-09 DIAGNOSIS — F32.A DEPRESSION, UNSPECIFIED: ICD-10-CM

## 2024-12-09 RX ORDER — ESCITALOPRAM OXALATE 20 MG/1
20 TABLET ORAL DAILY
Qty: 90 TABLET | Refills: 1 | Status: SHIPPED | OUTPATIENT
Start: 2024-12-09 | End: 2024-12-11 | Stop reason: SDUPTHER

## 2024-12-09 RX ORDER — BUPROPION HYDROCHLORIDE 75 MG/1
75 TABLET ORAL DAILY
Qty: 90 TABLET | Refills: 1 | Status: SHIPPED | OUTPATIENT
Start: 2024-12-09 | End: 2024-12-11 | Stop reason: SDUPTHER

## 2024-12-09 NOTE — TELEPHONE ENCOUNTER
Patient will have her endocrinologist appt at the same day and same time as her future appt with you. Doesn't know what to do, still requesting the medicine below:    Medication Name: buPROPion (Wellbutrin)  Dose: 75 mg tb  Frequency:1 tb daily   Quantity left: 0 tb     Medication Name:  escitalopram (Lexapro)   Dose: 20 mg tb  Frequency: 1 tb daily   Quantity left: 0 tb     Pharmacy:[facility]/pharmacy #[id number] [address] [facility] [address] [place] [address]        Last appointment:2/21/2024   Last CPE: N/A  Last MCW: 2/21/2024  Next appointment:12/13/2024  Next CPE: N/A   Next MCW: N/A

## 2024-12-10 ENCOUNTER — LAB (OUTPATIENT)
Dept: LAB | Facility: LAB | Age: 64
End: 2024-12-10
Payer: MEDICARE

## 2024-12-11 ENCOUNTER — LAB (OUTPATIENT)
Dept: LAB | Facility: LAB | Age: 64
End: 2024-12-11
Payer: MEDICARE

## 2024-12-11 ENCOUNTER — OFFICE VISIT (OUTPATIENT)
Dept: PRIMARY CARE | Facility: CLINIC | Age: 64
End: 2024-12-11
Payer: MEDICARE

## 2024-12-11 VITALS
TEMPERATURE: 98 F | HEART RATE: 57 BPM | SYSTOLIC BLOOD PRESSURE: 146 MMHG | OXYGEN SATURATION: 97 % | BODY MASS INDEX: 28.57 KG/M2 | WEIGHT: 177 LBS | DIASTOLIC BLOOD PRESSURE: 73 MMHG

## 2024-12-11 DIAGNOSIS — I10 ESSENTIAL HYPERTENSION: Primary | ICD-10-CM

## 2024-12-11 DIAGNOSIS — F32.0 DEPRESSION, MAJOR, SINGLE EPISODE, MILD (CMS-HCC): ICD-10-CM

## 2024-12-11 DIAGNOSIS — F41.9 ANXIETY DISORDER, UNSPECIFIED: ICD-10-CM

## 2024-12-11 DIAGNOSIS — F51.01 PRIMARY INSOMNIA: ICD-10-CM

## 2024-12-11 DIAGNOSIS — S76.311A STRAIN OF PIRIFORMIS MUSCLE, RIGHT, INITIAL ENCOUNTER: ICD-10-CM

## 2024-12-11 DIAGNOSIS — E11.9 TYPE 2 DIABETES MELLITUS WITHOUT COMPLICATION, WITHOUT LONG-TERM CURRENT USE OF INSULIN (MULTI): ICD-10-CM

## 2024-12-11 DIAGNOSIS — E66.811 CLASS 1 OBESITY DUE TO EXCESS CALORIES WITH SERIOUS COMORBIDITY AND BODY MASS INDEX (BMI) OF 31.0 TO 31.9 IN ADULT: ICD-10-CM

## 2024-12-11 DIAGNOSIS — E66.09 CLASS 1 OBESITY DUE TO EXCESS CALORIES WITH SERIOUS COMORBIDITY AND BODY MASS INDEX (BMI) OF 31.0 TO 31.9 IN ADULT: ICD-10-CM

## 2024-12-11 DIAGNOSIS — F32.A DEPRESSION, UNSPECIFIED: ICD-10-CM

## 2024-12-11 DIAGNOSIS — E78.5 DYSLIPIDEMIA: ICD-10-CM

## 2024-12-11 LAB
EST. AVERAGE GLUCOSE BLD GHB EST-MCNC: 140 MG/DL
HBA1C MFR BLD: 6.5 %

## 2024-12-11 PROCEDURE — 3048F LDL-C <100 MG/DL: CPT | Performed by: FAMILY MEDICINE

## 2024-12-11 PROCEDURE — 3077F SYST BP >= 140 MM HG: CPT | Performed by: FAMILY MEDICINE

## 2024-12-11 PROCEDURE — 99214 OFFICE O/P EST MOD 30 MIN: CPT | Performed by: FAMILY MEDICINE

## 2024-12-11 PROCEDURE — 3044F HG A1C LEVEL LT 7.0%: CPT | Performed by: FAMILY MEDICINE

## 2024-12-11 PROCEDURE — 3078F DIAST BP <80 MM HG: CPT | Performed by: FAMILY MEDICINE

## 2024-12-11 PROCEDURE — G2211 COMPLEX E/M VISIT ADD ON: HCPCS | Performed by: FAMILY MEDICINE

## 2024-12-11 PROCEDURE — 1036F TOBACCO NON-USER: CPT | Performed by: FAMILY MEDICINE

## 2024-12-11 PROCEDURE — 36415 COLL VENOUS BLD VENIPUNCTURE: CPT

## 2024-12-11 PROCEDURE — 83036 HEMOGLOBIN GLYCOSYLATED A1C: CPT

## 2024-12-11 RX ORDER — ESCITALOPRAM OXALATE 20 MG/1
20 TABLET ORAL DAILY
Qty: 90 TABLET | Refills: 1 | Status: SHIPPED | OUTPATIENT
Start: 2024-12-11

## 2024-12-11 RX ORDER — BUPROPION HYDROCHLORIDE 75 MG/1
75 TABLET ORAL DAILY
Qty: 90 TABLET | Refills: 1 | Status: SHIPPED | OUTPATIENT
Start: 2024-12-11

## 2024-12-11 RX ORDER — ESCITALOPRAM OXALATE 20 MG/1
20 TABLET ORAL DAILY
Qty: 90 TABLET | Refills: 0 | Status: SHIPPED | OUTPATIENT
Start: 2024-12-11

## 2024-12-11 ASSESSMENT — PAIN SCALES - GENERAL: PAINLEVEL_OUTOF10: 0-NO PAIN

## 2024-12-11 NOTE — TELEPHONE ENCOUNTER
Pt would like to know if you would be able to send in dexcon. She forgot to ask you about that today at her appt.

## 2024-12-11 NOTE — PROGRESS NOTES
Subjective   Patient ID: Celeste Neely is a 64 y.o. female who presents for Med Refill.    Here for general check and med refill.    Patient has a history of hypertension, hyperlipidemia, diabetes, depression and NMO    Has not been taking any DM meds  Tried restarting metformin but could not tolerate, increased Diarrhea  Has apt with endo on Friday.  Will be changing meds.     Weight is decreasing again  Daily BS checks are up again  NMO has been stable  Has not been on oral pred.   Taking Imuran regular.     No BP meds today yet  No home BP checks  Denies chest pain, shortness of breath, lightheaded, dizziness or headaches.   Working part time at [employer]  Standing all day    Admits to right buttock pain for the past 2 months  Would like PT referral.     Tried statin and could not tolerate.   Still eating vegetarian.   Mood is great, wellbutrin is helping.           Med Refill         Review of Systems    Objective   /73 (BP Location: Left arm, Patient Position: Sitting, BP Cuff Size: Adult)   Pulse 57   Temp 36.7 °C (98 °F) (Temporal)   Wt 80.3 kg (177 lb)   SpO2 97%   BMI 28.57 kg/m²     Physical Exam  Vitals and nursing note reviewed.   Constitutional:       Appearance: Normal appearance.   Cardiovascular:      Rate and Rhythm: Normal rate and regular rhythm.   Pulmonary:      Effort: Pulmonary effort is normal.      Breath sounds: Normal breath sounds.   Musculoskeletal:      Cervical back: Normal range of motion.   Neurological:      Mental Status: She is alert.   Psychiatric:         Mood and Affect: Mood normal.         Behavior: Behavior normal.         Thought Content: Thought content normal.         Judgment: Judgment normal.         Assessment/Plan   Problem List Items Addressed This Visit             ICD-10-CM    Depression, major, single episode, mild (CMS-HCC) F32.0     Stable and in remission.  Refilling meds at same dose.  Continue to monitor          Dyslipidemia E78.5     Checking fasting labs and treat accordingly  Last check not at diabetic goal.  Recommended coronary calcium score         Essential hypertension - Primary I10     Stable  Refilling medication at same dose.  Checking labs.   Recheck in 3 months         Insomnia G47.00    Type 2 diabetes mellitus without complication, without long-term current use of insulin (Multi) E11.9     Stable  Continue care per endocrinology         Relevant Orders    Hemoglobin A1C    Class 1 obesity due to excess calories with serious comorbidity and body mass index (BMI) of 31.0 to 31.9 in adult E66.811, E66.09, Z68.31     Improving.  Continue to monitor          Other Visit Diagnoses         Codes    Strain of piriformis muscle, right, initial encounter     S76.311A    Relevant Orders    Referral to Physical Therapy    Depression, unspecified     F32.A    Relevant Medications    escitalopram (Lexapro) 20 mg tablet    Anxiety disorder, unspecified     F41.9    Relevant Medications    buPROPion (Wellbutrin) 75 mg tablet

## 2024-12-11 NOTE — ASSESSMENT & PLAN NOTE
Checking fasting labs and treat accordingly  Last check not at diabetic goal.  Recommended coronary calcium score

## 2024-12-13 ENCOUNTER — APPOINTMENT (OUTPATIENT)
Dept: PRIMARY CARE | Facility: CLINIC | Age: 64
End: 2024-12-13
Payer: MEDICARE

## 2024-12-31 DIAGNOSIS — Z79.624 ENCOUNTER FOR LONG TERM CURRENT AZATHIOPRINE THERAPY: ICD-10-CM

## 2025-01-03 RX ORDER — AMLODIPINE BESYLATE 10 MG/1
10 TABLET ORAL DAILY
Qty: 90 TABLET | Refills: 1 | OUTPATIENT
Start: 2025-01-03

## 2025-01-27 DIAGNOSIS — E11.9 TYPE 2 DIABETES MELLITUS WITHOUT COMPLICATION, WITHOUT LONG-TERM CURRENT USE OF INSULIN (MULTI): Primary | ICD-10-CM

## 2025-01-27 RX ORDER — TIRZEPATIDE 5 MG/.5ML
5 INJECTION, SOLUTION SUBCUTANEOUS
Qty: 2 ML | Refills: 1 | Status: SHIPPED | OUTPATIENT
Start: 2025-01-27 | End: 2025-03-28

## 2025-02-25 ENCOUNTER — APPOINTMENT (OUTPATIENT)
Dept: ENDOCRINOLOGY | Facility: CLINIC | Age: 65
End: 2025-02-25
Payer: MEDICARE

## 2025-03-07 LAB
ALBUMIN SERPL-MCNC: 4.3 G/DL (ref 3.6–5.1)
ALBUMIN SERPL-MCNC: 4.4 G/DL (ref 3.6–5.1)
ALBUMIN/CREAT UR: 45 MG/G CREAT
ALP SERPL-CCNC: 83 U/L (ref 37–153)
ALP SERPL-CCNC: 84 U/L (ref 37–153)
ALT SERPL-CCNC: 24 U/L (ref 6–29)
ALT SERPL-CCNC: 25 U/L (ref 6–29)
ANION GAP SERPL CALCULATED.4IONS-SCNC: 8 MMOL/L (CALC) (ref 7–17)
ANION GAP SERPL CALCULATED.4IONS-SCNC: 9 MMOL/L (CALC) (ref 7–17)
AST SERPL-CCNC: 20 U/L (ref 10–35)
AST SERPL-CCNC: 20 U/L (ref 10–35)
BASOPHILS # BLD AUTO: 31 CELLS/UL (ref 0–200)
BASOPHILS NFR BLD AUTO: 0.7 %
BILIRUB SERPL-MCNC: 0.9 MG/DL (ref 0.2–1.2)
BILIRUB SERPL-MCNC: 0.9 MG/DL (ref 0.2–1.2)
BUN SERPL-MCNC: 9 MG/DL (ref 7–25)
BUN SERPL-MCNC: 9 MG/DL (ref 7–25)
CALCIUM SERPL-MCNC: 9.1 MG/DL (ref 8.6–10.4)
CALCIUM SERPL-MCNC: 9.2 MG/DL (ref 8.6–10.4)
CHLORIDE SERPL-SCNC: 101 MMOL/L (ref 98–110)
CHLORIDE SERPL-SCNC: 101 MMOL/L (ref 98–110)
CHOLEST SERPL-MCNC: 154 MG/DL
CHOLEST/HDLC SERPL: 3.4 (CALC)
CO2 SERPL-SCNC: 28 MMOL/L (ref 20–32)
CO2 SERPL-SCNC: 28 MMOL/L (ref 20–32)
CREAT SERPL-MCNC: 0.54 MG/DL (ref 0.5–1.05)
CREAT SERPL-MCNC: 0.54 MG/DL (ref 0.5–1.05)
CREAT UR-MCNC: 107 MG/DL (ref 20–275)
EGFRCR SERPLBLD CKD-EPI 2021: 103 ML/MIN/1.73M2
EGFRCR SERPLBLD CKD-EPI 2021: 103 ML/MIN/1.73M2
EOSINOPHIL # BLD AUTO: 40 CELLS/UL (ref 15–500)
EOSINOPHIL NFR BLD AUTO: 0.9 %
ERYTHROCYTE [DISTWIDTH] IN BLOOD BY AUTOMATED COUNT: 13.7 % (ref 11–15)
EST. AVERAGE GLUCOSE BLD GHB EST-MCNC: 134 MG/DL
EST. AVERAGE GLUCOSE BLD GHB EST-SCNC: 7.4 MMOL/L
GLUCOSE SERPL-MCNC: 97 MG/DL (ref 65–99)
GLUCOSE SERPL-MCNC: 97 MG/DL (ref 65–99)
HBA1C MFR BLD: 6.3 % OF TOTAL HGB
HCT VFR BLD AUTO: 40.3 % (ref 35–45)
HDLC SERPL-MCNC: 45 MG/DL
HGB BLD-MCNC: 13.3 G/DL (ref 11.7–15.5)
LDLC SERPL CALC-MCNC: 95 MG/DL (CALC)
LYMPHOCYTES # BLD AUTO: 1126 CELLS/UL (ref 850–3900)
LYMPHOCYTES NFR BLD AUTO: 25.6 %
MCH RBC QN AUTO: 26.6 PG (ref 27–33)
MCHC RBC AUTO-ENTMCNC: 33 G/DL (ref 32–36)
MCV RBC AUTO: 80.6 FL (ref 80–100)
MICROALBUMIN UR-MCNC: 4.8 MG/DL
MONOCYTES # BLD AUTO: 356 CELLS/UL (ref 200–950)
MONOCYTES NFR BLD AUTO: 8.1 %
NEUTROPHILS # BLD AUTO: 2847 CELLS/UL (ref 1500–7800)
NEUTROPHILS NFR BLD AUTO: 64.7 %
NONHDLC SERPL-MCNC: 109 MG/DL (CALC)
PLATELET # BLD AUTO: 315 THOUSAND/UL (ref 140–400)
PMV BLD REES-ECKER: 10.4 FL (ref 7.5–12.5)
POTASSIUM SERPL-SCNC: 4 MMOL/L (ref 3.5–5.3)
POTASSIUM SERPL-SCNC: 4 MMOL/L (ref 3.5–5.3)
PROT SERPL-MCNC: 6.9 G/DL (ref 6.1–8.1)
PROT SERPL-MCNC: 6.9 G/DL (ref 6.1–8.1)
RBC # BLD AUTO: 5 MILLION/UL (ref 3.8–5.1)
SODIUM SERPL-SCNC: 137 MMOL/L (ref 135–146)
SODIUM SERPL-SCNC: 138 MMOL/L (ref 135–146)
TRIGL SERPL-MCNC: 55 MG/DL
TSH SERPL-ACNC: 1.24 MIU/L (ref 0.4–4.5)
WBC # BLD AUTO: 4.4 THOUSAND/UL (ref 3.8–10.8)

## 2025-03-07 NOTE — PROGRESS NOTES
Subjective   Celeste Neely is a 64 y.o. female who presents for follow-up of Type 2 diabetes mellitus. The initial diagnosis of diabetes was made in 2010 .   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 She subsequently lost the weight and her hemoglobin A1c improved to 5.2%. She was taking off medications. In 2017 her disease flared up again and she was restarted on glucocorticoid for one year.     She has had no major changes to her health since her last appointment.    Known complications due to diabetes included none    Cardiovascular risk factors include diabetes mellitus. The patient is not on an ACE inhibitor or angiotensin II receptor blocker.  The patient has not been previously hospitalized due to diabetic ketoacidosis.     Current symptoms/problems include none. Her clinical course has been stable.     The patient is currently checking the blood glucose.    Patient is using: glucometer    Hypoglycemia frequency: Denies   Hypoglycemia awareness: N/A     Previous medications tried:  Metformin  Glipizide  She has no desire to go back on any of these agents.      She did have a CT scan of her neck on December 6, 2022 which revealed a 3 cm lesion to the left thyroid gland.     Thyroid ultrasound was obtained on December 12, 2022 which revealed a moderately suspicious left thyroid nodule.     She underwent a fine-needle aspiration biopsy on February 2, 2023. Cytology revealed a benign follicular nodule.     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    Cardiovascular:   "Negative for chest pain.   Gastrointestinal:  Negative for abdominal pain, nausea and vomiting.   Neurological:  Positive for headaches.   All other systems reviewed and are negative.      Objective   /74   Pulse 64   Ht 1.676 m (5' 6\")   Wt 78.7 kg (173 lb 9.6 oz)   BMI 28.02 kg/m²   Physical Exam  Vitals and nursing note reviewed.   Constitutional:       General: She is not in acute distress.     Appearance: Normal appearance. She is normal weight.   HENT:      Head: Normocephalic and atraumatic.      Nose: Nose normal.      Mouth/Throat:      Mouth: Mucous membranes are moist.   Eyes:      Extraocular Movements: Extraocular movements intact.   Neck:      Thyroid: Thyroid mass (Left side nodule) present. No thyroid tenderness.   Cardiovascular:      Rate and Rhythm: Normal rate and regular rhythm.   Pulmonary:      Effort: Pulmonary effort is normal.      Breath sounds: Normal breath sounds.   Musculoskeletal:         General: Normal range of motion.      Right foot: Normal range of motion. No deformity, bunion or Charcot foot.      Left foot: Normal range of motion. No deformity, bunion or Charcot foot.   Feet:      Right foot:      Skin integrity: Dry skin present. No ulcer, blister, skin breakdown or erythema.      Toenail Condition: Right toenails are normal.      Left foot:      Skin integrity: Dry skin present. No ulcer, blister, skin breakdown or erythema.      Toenail Condition: Left toenails are normal.   Skin:     General: Skin is warm.   Neurological:      Mental Status: She is alert and oriented to person, place, and time.   Psychiatric:         Mood and Affect: Mood normal.         Lab Review  GLUCOSE (mg/dL)   Date Value   03/06/2025 97   03/06/2025 97     Glucose (mg/dL)   Date Value   01/05/2024 101 (H)   10/26/2023 113 (H)   08/16/2023 140 (H)   06/21/2023 105 (H)   04/04/2023 242 (H)     HEMOGLOBIN A1c (% of total Hgb)   Date Value   03/06/2025 6.3 (H)     Hemoglobin A1C (%)   Date " Value   12/11/2024 6.5 (H)   08/19/2024 6.6 (H)   01/03/2024 5.9 (H)     CARBON DIOXIDE (mmol/L)   Date Value   03/06/2025 28   03/06/2025 28     Bicarbonate (mmol/L)   Date Value   01/05/2024 31   10/26/2023 28   08/16/2023 29   06/21/2023 27   04/04/2023 20 (L)     UREA NITROGEN (BUN) (mg/dL)   Date Value   03/06/2025 9   03/06/2025 9     Urea Nitrogen (mg/dL)   Date Value   01/05/2024 8   10/26/2023 7   08/16/2023 6   06/21/2023 8   04/04/2023 17     Creatinine (mg/dL)   Date Value   01/05/2024 0.53   10/26/2023 0.51   08/16/2023 0.50   06/21/2023 0.62   04/04/2023 0.70     CREATININE (mg/dL)   Date Value   03/06/2025 0.54   03/06/2025 0.54     Lab Results   Component Value Date    CHOL 154 03/06/2025    CHOL 152 01/05/2024    CHOL 148 10/26/2023     Lab Results   Component Value Date    HDL 45 (L) 03/06/2025    HDL 41.7 01/05/2024    HDL 39.9 10/26/2023     Lab Results   Component Value Date    LDLCALC 95 03/06/2025    LDLCALC 99 01/05/2024    LDLCALC 95 10/26/2023     Lab Results   Component Value Date    TRIG 55 03/06/2025    TRIG 58 01/05/2024    TRIG 64 10/26/2023     Lab Results   Component Value Date    TSH 1.24 03/06/2025    THYROIDPAB 24 12/11/2018       Health Maintenance:   Foot Exam: March 10, 2025   Eye Exam: November 26, 2024   Urine Albumin: March 6, 2025    Assessment/Plan   64-year-old female presents for follow-up for type 2 diabetes. Her A1C is at goal.  Her blood pressure is at goal today.      She was found to have a 3 cm left thyroid nodule on a CT scan of her spine on December 6, 2022. She has no compressive symptoms. She is clinically euthyroid. She did undergo fine-needle aspiration biopsy on February 2, 2023. Cytology revealed a benign follicular nodule.    Type 2 diabetes mellitus without complication, without long-term current use of insulin (Multi)  To continue Mounjaro 5mg subcutaneous once weekly    The A1C is at goal     Left thyroid nodule  To obtain a thyroid ultrasound   The  thyroid is functioning normally     For follow up in 6 months

## 2025-03-07 NOTE — PATIENT INSTRUCTIONS
Thank you for choosing [facility]  for your health care needs.  If you have any questions, concerns or medical needs, please feel free to contact our office at [phone].    Please ensure you complete your blood work one week before the next scheduled appointment.    To continue Mounjaro 5mg subcutaneous once weekly    To obtain a thyroid ultrasound   For follow up in 6 months

## 2025-03-10 ENCOUNTER — APPOINTMENT (OUTPATIENT)
Dept: ENDOCRINOLOGY | Facility: CLINIC | Age: 65
End: 2025-03-10
Payer: MEDICARE

## 2025-03-10 VITALS
HEART RATE: 64 BPM | SYSTOLIC BLOOD PRESSURE: 128 MMHG | WEIGHT: 173.6 LBS | HEIGHT: 66 IN | DIASTOLIC BLOOD PRESSURE: 74 MMHG | BODY MASS INDEX: 27.9 KG/M2

## 2025-03-10 DIAGNOSIS — E11.9 TYPE 2 DIABETES MELLITUS WITHOUT COMPLICATION, WITHOUT LONG-TERM CURRENT USE OF INSULIN (MULTI): ICD-10-CM

## 2025-03-10 DIAGNOSIS — E04.1 LEFT THYROID NODULE: ICD-10-CM

## 2025-03-10 PROCEDURE — 99215 OFFICE O/P EST HI 40 MIN: CPT | Performed by: INTERNAL MEDICINE

## 2025-03-10 PROCEDURE — 3074F SYST BP LT 130 MM HG: CPT | Performed by: INTERNAL MEDICINE

## 2025-03-10 PROCEDURE — 3078F DIAST BP <80 MM HG: CPT | Performed by: INTERNAL MEDICINE

## 2025-03-10 PROCEDURE — 3008F BODY MASS INDEX DOCD: CPT | Performed by: INTERNAL MEDICINE

## 2025-03-10 PROCEDURE — G2211 COMPLEX E/M VISIT ADD ON: HCPCS | Performed by: INTERNAL MEDICINE

## 2025-03-10 ASSESSMENT — ENCOUNTER SYMPTOMS
SHORTNESS OF BREATH: 0
NAUSEA: 0
VOMITING: 0
ABDOMINAL PAIN: 0
HEADACHES: 1

## 2025-03-13 RX ORDER — TIRZEPATIDE 5 MG/.5ML
5 INJECTION, SOLUTION SUBCUTANEOUS
Qty: 2 ML | Refills: 5 | Status: SHIPPED | OUTPATIENT
Start: 2025-03-13 | End: 2025-05-12

## 2025-03-14 NOTE — ASSESSMENT & PLAN NOTE
To obtain a thyroid ultrasound   The thyroid is functioning normally     For follow up in 6 months

## 2025-04-05 DIAGNOSIS — G36.0 NEUROMYELITIS OPTICA (MULTI): ICD-10-CM

## 2025-04-07 RX ORDER — AZATHIOPRINE 50 MG/1
200 TABLET ORAL DAILY
Qty: 360 TABLET | Refills: 0 | Status: SHIPPED | OUTPATIENT
Start: 2025-04-07 | End: 2025-07-06

## 2025-04-27 ENCOUNTER — OFFICE VISIT (OUTPATIENT)
Dept: URGENT CARE | Age: 65
End: 2025-04-27
Payer: MEDICARE

## 2025-04-27 VITALS
HEART RATE: 65 BPM | TEMPERATURE: 96.7 F | SYSTOLIC BLOOD PRESSURE: 150 MMHG | OXYGEN SATURATION: 99 % | DIASTOLIC BLOOD PRESSURE: 80 MMHG

## 2025-04-27 DIAGNOSIS — F41.9 ANXIETY DISORDER, UNSPECIFIED: ICD-10-CM

## 2025-04-27 DIAGNOSIS — Z76.0 MEDICATION REFILL: Primary | ICD-10-CM

## 2025-04-27 DIAGNOSIS — F32.A DEPRESSION, UNSPECIFIED: ICD-10-CM

## 2025-04-27 DIAGNOSIS — I10 ESSENTIAL HYPERTENSION: ICD-10-CM

## 2025-04-27 PROCEDURE — 3077F SYST BP >= 140 MM HG: CPT | Performed by: NURSE PRACTITIONER

## 2025-04-27 PROCEDURE — 3079F DIAST BP 80-89 MM HG: CPT | Performed by: NURSE PRACTITIONER

## 2025-04-27 PROCEDURE — 99203 OFFICE O/P NEW LOW 30 MIN: CPT | Performed by: NURSE PRACTITIONER

## 2025-04-27 RX ORDER — BUPROPION HYDROCHLORIDE 75 MG/1
75 TABLET ORAL DAILY
Qty: 30 TABLET | Refills: 1 | Status: SHIPPED | OUTPATIENT
Start: 2025-04-27 | End: 2025-05-27

## 2025-04-27 RX ORDER — CHOLECALCIFEROL (VITAMIN D3) 25 MCG
25 TABLET ORAL DAILY
Qty: 30 TABLET | Refills: 1 | Status: SHIPPED | OUTPATIENT
Start: 2025-04-27 | End: 2025-05-27

## 2025-04-27 RX ORDER — ESCITALOPRAM OXALATE 20 MG/1
20 TABLET ORAL DAILY
Qty: 30 TABLET | Refills: 1 | Status: SHIPPED | OUTPATIENT
Start: 2025-04-27 | End: 2025-05-27

## 2025-04-27 RX ORDER — AMLODIPINE BESYLATE 10 MG/1
10 TABLET ORAL DAILY
Qty: 30 TABLET | Refills: 1 | Status: SHIPPED | OUTPATIENT
Start: 2025-04-27 | End: 2025-05-27

## 2025-04-27 NOTE — PROGRESS NOTES
Subjective   Patient ID: Celeste Neely is a 64 y.o. female. They present today with a chief complaint of Blood Pressure Check (Pt stated she wants her BP checked, and she is out of her BP meds, wakes up with headaches, and she's going on 10 days with out BP meds. Also, she said her neck and glands feel full.).    History of Present Illness  63 yo female coming in for needing her medications refilled. She states she has been out of her blood pressure medication for the last 10 days and has noticed that her blood pressure is up. She states she has been waking up with headaches in the am. She states she also feels like her glands are swollen. She denies any chest pain or shortness of breath. She denies any vision changes.     Past Medical History  Allergies as of 04/27/2025 - Reviewed 03/10/2025   Allergen Reaction Noted    Penicillins Rash 04/17/2023       Prescriptions Prior to Admission[1]     Medical History[2]    Surgical History[3]     reports that she has never smoked. She has never been exposed to tobacco smoke. She has never used smokeless tobacco. She reports that she does not currently use alcohol. She reports that she does not currently use drugs.    Review of Systems  Review of Systems:  General: No weight loss, fatigue, anorexia, insomnia, fever, chills.  Eyes: No vision loss, double vision, blurred vision, drainage, or eye pain.  Cardiac: No chest pain, palpitations, syncope, near syncope.  Pulmonary:  No shortness of breath, cough, hemoptysis  Heme/lymph: No swollen glands, fever, bleeding  Musculoskeletal: No limb pain, joint pain, joint swelling.  Skin: No rashes  Neuro: No numbness, tingling, positive headaches                                 Objective    Vitals:    04/27/25 1113   BP: 150/80   BP Location: Left arm   Patient Position: Sitting   BP Cuff Size: Adult long   Pulse: 65   Temp: 35.9 °C (96.7 °F)   TempSrc: Oral   SpO2: 99%     No LMP recorded. Patient has had a hysterectomy.    Physical  Exam  Physical Exam:  General: Vital noted, no distress. Afebrile  Neck: Supple. No meningismus through full range of motion. No lymphadenopathy.  Cardiac: Regular rate and rhythm, no murmur  Pulmonary: Lungs clear bilaterally with good aeration. No adventitious breath sounds.  Skin: No rashes    Procedures    Point of Care Test & Imaging Results from this visit  No results found for this visit on 04/27/25.   Imaging  No results found.    Cardiology, Vascular, and Other Imaging  No other imaging results found for the past 2 days      Diagnostic study results (if any) were reviewed by YSEICA Gonzalez.    Assessment/Plan   Allergies, medications, history, and pertinent labs/EKGs/Imaging reviewed by YESICA Gonzalez.     Medical Decision Making  Treatment: Amlodipine, wellbutrin, Lexapro, and Vitamin D-3 prescribed.  Differential: 1) medication refill , 2) headache , 3) elevated blood pressure  Plan: Patient will follow up with the PCP in the next 2-3 days. Return for any worsening symptoms or go to the ER for further evaluation. Patient understands return precautions and discharge insturctions.  Impression:   1) medication refill      Orders and Diagnoses  Diagnoses and all orders for this visit:  Medication refill  -     cholecalciferol (Vitamin D-3) 25 mcg (1,000 units) tablet; Take 1 tablet (25 mcg) by mouth once daily.  Essential hypertension  -     amLODIPine (Norvasc) 10 mg tablet; Take 1 tablet (10 mg) by mouth once daily.  Anxiety disorder, unspecified  -     buPROPion (Wellbutrin) 75 mg tablet; Take 1 tablet (75 mg) by mouth once daily.  Depression, unspecified  -     escitalopram (Lexapro) 20 mg tablet; Take 1 tablet (20 mg) by mouth once daily.      Medical Admin Record      Patient disposition: Home    Electronically signed by YESICA Gonzalez  12:08 PM           [1] (Not in a hospital admission)   [2]   Past Medical History:  Diagnosis Date    Anxiety disorder, unspecified  06/12/2014    Anxiety    Neuromyelitis optica (devic) 01/06/2020    Neuromyelitis optica    Personal history of other diseases of the circulatory system     History of hypertension    Type 2 diabetes mellitus without complications (Multi) 10/09/2022    Diabetes mellitus   [3]   Past Surgical History:  Procedure Laterality Date    HYSTERECTOMY  06/12/2014    Hysterectomy

## 2025-06-12 ENCOUNTER — OFFICE VISIT (OUTPATIENT)
Dept: NEUROLOGY | Facility: CLINIC | Age: 65
End: 2025-06-12
Payer: MEDICARE

## 2025-06-12 VITALS
WEIGHT: 170 LBS | RESPIRATION RATE: 18 BRPM | HEIGHT: 66 IN | BODY MASS INDEX: 27.32 KG/M2 | DIASTOLIC BLOOD PRESSURE: 75 MMHG | HEART RATE: 63 BPM | SYSTOLIC BLOOD PRESSURE: 118 MMHG

## 2025-06-12 DIAGNOSIS — G36.0 NEUROMYELITIS OPTICA (MULTI): Primary | ICD-10-CM

## 2025-06-12 PROCEDURE — 3074F SYST BP LT 130 MM HG: CPT | Performed by: PSYCHIATRY & NEUROLOGY

## 2025-06-12 PROCEDURE — 99214 OFFICE O/P EST MOD 30 MIN: CPT | Performed by: PSYCHIATRY & NEUROLOGY

## 2025-06-12 PROCEDURE — 99214 OFFICE O/P EST MOD 30 MIN: CPT | Mod: GC | Performed by: PSYCHIATRY & NEUROLOGY

## 2025-06-12 PROCEDURE — 1036F TOBACCO NON-USER: CPT | Performed by: PSYCHIATRY & NEUROLOGY

## 2025-06-12 PROCEDURE — 3078F DIAST BP <80 MM HG: CPT | Performed by: PSYCHIATRY & NEUROLOGY

## 2025-06-12 PROCEDURE — 3008F BODY MASS INDEX DOCD: CPT | Performed by: PSYCHIATRY & NEUROLOGY

## 2025-06-12 RX ORDER — AZATHIOPRINE 50 MG/1
200 TABLET ORAL DAILY
Qty: 360 TABLET | Refills: 0 | Status: SHIPPED | OUTPATIENT
Start: 2025-06-12 | End: 2025-09-10

## 2025-06-12 RX ORDER — TIRZEPATIDE 5 MG/.5ML
5 INJECTION, SOLUTION SUBCUTANEOUS
COMMUNITY

## 2025-06-12 ASSESSMENT — PAIN SCALES - GENERAL: PAINLEVEL_OUTOF10: 3

## 2025-06-12 NOTE — PROGRESS NOTES
Subjective     Eric Neely is a   64 y.o. year old female with seropositive NMO who presents for follow-up visit, last seen 6/2023.    HPI   Ms. ERIC NEELY is a 62 year woman who presents to neuromuscular clinic for follow up of neuro myelitis optica with recent hospitalization for left optic neuritis. Her past medical history is significant for diabetes mellitus, neuromyelitis optica. She was last seen on 1/27/2022.      In early April she developed left painful vision loss. She was hospitalized on 4/4/2023 due to left optic neuritis seen on MRI orbits. She received 1 g Solu-Medrol for 5 days and was discharged on a prednisone taper. The prednisone taper has since been stopped and she has been continued on azathioprine 500 mg daily. Today, she reports that she has noticed that her vision has improved, but is not back to baseline. She continues to have some vision loss in the upper half of her left eye. She no longer has any pain in the eye.     She also continues to have mild residual numbness in her hands and fingertips. She has pain around her right posterior lateral neck as well. She will get hiccups daily, but not for prolonged peers of time. She denies any new numbness, weakness, vomiting. No bowel or bladder issues.     To recap, she initially presented in 2010 with severe nausea, vomiting and vertigo followed by imbalance and leg and arm weakness. She had a very large and long upper cervical myelitis extending into the medulla. She was diagnosed with seropositive neuromyelitis optica and was treated with steroid and plasma exchange. She was placed on Imuran and did very well for several years. In 2017, when her Imuran dose was slightly reduced, she had a relapse with left optic neuritis and recurrent cervical myelopathy requiring treatment with steroids as well as increasing her Imuran dose. She has been on Imuran 150 mg since.     Otherwise, the past medical history, social history, and review of  "systems were reviewed. There are no significant changes.    Interval Hx:    Here for a follow up visit. Things are going well, taking Imuran without difficulty. Last event pt had was 4/2023, had vision loss of her left eye, improved with steroids in hospital.  Has been on disability given her history of neuromyelitis optica.     Believes the vision is slightly improved since her hospital admission, although has not had formal visual acuity testing since then. The vision blurriness is the temporal field of her left eye, resolves with closing the left eye.     Sensation of \"vibration\" up the spine from sacrum extending to cervical, shoulders, sides of neck, bottom of her feet. Also with burning sensation R postauricular extending down the neck, has diminished minimally since last visit. Uses biofreeze that makes it tolerable. Has tried gabapentin in the past, ineffective. Still with hiccups, daily, intermittent, in the evening, lasting about 30-60 minutes. No urinary incontinence, no new symptoms since last visit. Believes overall her symptoms are stable with minimal improvement since last visit. Requests to remain on Imuran given no flares since 2023.    Review of Systems  All other systems have been reviewed and are negative for complaint.     Medical History[1]    Current Medications[2]    Family History[3]  Surgical History[4]   Social History     Tobacco Use    Smoking status: Never     Passive exposure: Never    Smokeless tobacco: Never   Substance Use Topics    Alcohol use: Not Currently          Objective     Neurological Exam  GENERAL APPEARANCE:  No distress, alert, interactive and cooperative.      MENTAL STATE:   Recent and remote memory was intact.  Attention span and concentration were normal. Language testing was normal for comprehension, repetition, expression, and naming. General fund of knowledge was intact.     CRANIAL NERVES:   CN 2   Visual fields full to confrontation.   CN 3, 4, 6   Pupils " round, 3 mm in diameter, equally reactive to light, subtle L RAPD noted, Lids symmetric; no ptosis. EOMs normal alignment, full range with normal saccades, pursuit and convergence, no pain with extraocular movements  No nystagmus.   CN 5   Facial sensation intact bilaterally.   CN 7   Normal and symmetric facial strength. Nasolabial folds symmetric.   CN 8   Hearing intact to conversation.  CN 11   Normal strength of shoulder shrug and neck turning.   CN 12   Tongue midline, with normal bulk and strength; no fasciculations.     MOTOR:   Muscle bulk and tone were normal in both upper and lower extremities.   No fasciculations, tremor or other abnormal movements were present.                         R          L  Deltoids        5          5  Biceps          5          5  Triceps          5          5  Wrist Flex      5          5  Wrist Ext       5          5    Hip Flex         5          5  Knee Flex      5          5  Knee Ext        5          5  Dorsiflex         5          5  Plantarflex      5          5    REFLEXES:                       R          L  BR:               2         2  Biceps:         2          2  Knee:            2          2  Ankle:          2          2    No clonus or other pathologic reflexes present.     SENSORY:   Sensory loss to light touch plantar aspect of feet bilaterally extending proximally about 1/2 distance to the malleoli bilaterally  Bilateral upper extremities intact to light touch    COORDINATION:    In both upper extremities, finger-nose-finger was intact without dysmetria or overshoot.   In both lower extremities, heel-to-shin was intact.      GAIT:   Station was stable with a normal base. Gait was stable with a normal arm swing and speed. No ataxia, shuffling, steppage or waddling was present. No circumduction was present. No Romberg sign was present.    Physical Exam    Component  Ref Range & Units 3 mo ago   WHITE BLOOD CELL COUNT  3.8 - 10.8 Thousand/uL 4.4   RED BLOOD  CELL COUNT  3.80 - 5.10 Million/uL 5.00   HEMOGLOBIN  11.7 - 15.5 g/dL 13.3   HEMATOCRIT  35.0 - 45.0 % 40.3   MCV  80.0 - 100.0 fL 80.6   MCH  27.0 - 33.0 pg 26.6 Low    MCHC  32.0 - 36.0 g/dL 33.0   Comment: For adults, a slight decrease in the calculated MCHC  value (in the range of 30 to 32 g/dL) is most likely  not clinically significant; however, it should be  interpreted with caution in correlation with other  red cell parameters and the patient's clinical  condition.   RDW  11.0 - 15.0 % 13.7   PLATELET COUNT  140 - 400 Thousand/uL 315   MPV  7.5 - 12.5 fL 10.4   ABSOLUTE NEUTROPHILS  1,500 - 7,800 cells/uL 2,847   ABSOLUTE LYMPHOCYTES  850 - 3,900 cells/uL 1,126   ABSOLUTE MONOCYTES  200 - 950 cells/uL 356   ABSOLUTE EOSINOPHILS  15 - 500 cells/uL 40   ABSOLUTE BASOPHILS  0 - 200 cells/uL 31   NEUTROPHILS  % 64.7   LYMPHOCYTES  % 25.6   MONOCYTES  % 8.1   EOSINOPHILS  % 0.9   BASOPHILS  % 0.7   Resulting Agency [employer] [facility]     Component  Ref Range & Units 3 mo ago  (3/6/25) 3 mo ago  (3/6/25)   GLUCOSE  65 - 99 mg/dL 97 97 CM   Comment:               Fasting reference interval      UREA NITROGEN (BUN)  7 - 25 mg/dL 9 9   CREATININE  0.50 - 1.05 mg/dL 0.54 0.54   EGFR  > OR = 60 mL/min/1.73m2 103 103   SODIUM  135 - 146 mmol/L 137 138   POTASSIUM  3.5 - 5.3 mmol/L 4.0 4.0   CHLORIDE  98 - 110 mmol/L 101 101   CARBON DIOXIDE  20 - 32 mmol/L 28 28   ELECTROLYTE BALANCE  7 - 17 mmol/L (calc) 8 9   CALCIUM  8.6 - 10.4 mg/dL 9.1 9.2   PROTEIN, TOTAL  6.1 - 8.1 g/dL 6.9 6.9   ALBUMIN  3.6 - 5.1 g/dL 4.4 4.3   BILIRUBIN, TOTAL  0.2 - 1.2 mg/dL 0.9 0.9   ALKALINE PHOSPHATASE  37 - 153 U/L 84 83   AST  10 - 35 U/L 20 20   ALT  6 - 29 U/L 25 24   Resulting Agency [employer] [facility]             Assessment/Plan     Celeste Neely is a  64 y.o. year old female with seropositive NMO who presents for follow-up visit,  last seen 6/2023. No flare since 4/2023. Vision has improved since last visit, most notable in the L temporal visual field, monocular, consistent with her prior L sided optic neuritis. Dysesthesias to her neck, distal extremities, and paraspinal are stable from prior visit. No new symptoms concerning for NMO progression. Will continue Imuran, if pt were to experience flare in the future, will strongly recommend new therapy, such as Soliris. Will note that although pt stable with subtle improvement, her deficits continue to limit her day-to-day function and are seen to worsen in the setting of fatigue.    -Continue Imuran 200mg daily  -CBC/CMP every months  -Follow-up in 1 year    Seen and discussed with attending Dr. Khoury who agrees with assessment and plan,    Richard Queen DO  PGY-2 Neurology       [1]   Past Medical History:  Diagnosis Date    Anxiety disorder, unspecified 06/12/2014    Anxiety    Neuromyelitis optica (devic) 01/06/2020    Neuromyelitis optica    Personal history of other diseases of the circulatory system     History of hypertension    Type 2 diabetes mellitus without complications (Multi) 10/09/2022    Diabetes mellitus   [2]   Current Outpatient Medications:     amLODIPine (Norvasc) 10 mg tablet, Take 1 tablet (10 mg) by mouth once daily., Disp: 30 tablet, Rfl: 1    Blood glucose monitoring meter kit kit, 1 each if needed., Disp: , Rfl:     Blood glucose monitoring meter kit kit, 1 each once daily., Disp: 1 each, Rfl: 0    blood sugar diagnostic (OneTouch Ultra Test) strip, 1 each once daily., Disp: 100 strip, Rfl: 3    buPROPion (Wellbutrin) 75 mg tablet, Take 1 tablet (75 mg) by mouth once daily., Disp: 30 tablet, Rfl: 1    escitalopram (Lexapro) 20 mg tablet, Take 1 tablet (20 mg) by mouth once daily., Disp: 90 tablet, Rfl: 1    multivitamin-Ca-iron-minerals (Tab-A-Paul Womens) 27-0.4 mg tablet, 1 tablet once daily., Disp: , Rfl:     tirzepatide (Mounjaro) 5 mg/0.5 mL pen injector, Inject  5 mg under the skin every 7 days., Disp: , Rfl:     azaTHIOprine (Imuran) 50 mg tablet, Take 4 tablets (200 mg) by mouth once daily., Disp: 360 tablet, Rfl: 0    escitalopram (Lexapro) 20 mg tablet, Take 1 tablet (20 mg) by mouth once daily., Disp: 30 tablet, Rfl: 1  [3]   Family History  Problem Relation Name Age of Onset    Colon cancer Father     [4]   Past Surgical History:  Procedure Laterality Date    HYSTERECTOMY  06/12/2014    Hysterectomy        Current Outpatient Medications:     amLODIPine (Norvasc) 10 mg tablet, Take 1 tablet (10 mg) by mouth once daily., Disp: 30 tablet, Rfl: 1    Blood glucose monitoring meter kit kit, 1 each if needed., Disp: , Rfl:     Blood glucose monitoring meter kit kit, 1 each once daily., Disp: 1 each, Rfl: 0    blood sugar diagnostic (OneTouch Ultra Test) strip, 1 each once daily., Disp: 100 strip, Rfl: 3    buPROPion (Wellbutrin) 75 mg tablet, Take 1 tablet (75 mg) by mouth once daily., Disp: 30 tablet, Rfl: 1    escitalopram (Lexapro) 20 mg tablet, Take 1 tablet (20 mg) by mouth once daily., Disp: 90 tablet, Rfl: 1    multivitamin-Ca-iron-minerals (Tab-A-Paul Womens) 27-0.4 mg tablet, 1 tablet once daily., Disp: , Rfl:     tirzepatide (Mounjaro) 5 mg/0.5 mL pen injector, Inject 5 mg under the skin every 7 days., Disp: , Rfl:     azaTHIOprine (Imuran) 50 mg tablet, Take 4 tablets (200 mg) by mouth once daily., Disp: 360 tablet, Rfl: 0    escitalopram (Lexapro) 20 mg tablet, Take 1 tablet (20 mg) by mouth once daily., Disp: 30 tablet, Rfl: 1  [3]   Family History  Problem Relation Name Age of Onset    Colon cancer Father     [4]   Past Surgical History:  Procedure Laterality Date    HYSTERECTOMY  06/12/2014    Hysterectomy

## 2025-06-20 ENCOUNTER — APPOINTMENT (OUTPATIENT)
Dept: PRIMARY CARE | Facility: CLINIC | Age: 65
End: 2025-06-20
Payer: MEDICARE

## 2025-06-20 VITALS
OXYGEN SATURATION: 97 % | WEIGHT: 168.6 LBS | SYSTOLIC BLOOD PRESSURE: 124 MMHG | HEART RATE: 78 BPM | BODY MASS INDEX: 27.21 KG/M2 | DIASTOLIC BLOOD PRESSURE: 70 MMHG

## 2025-06-20 DIAGNOSIS — Z00.00 MEDICARE ANNUAL WELLNESS VISIT, SUBSEQUENT: Primary | ICD-10-CM

## 2025-06-20 DIAGNOSIS — Z00.00 ANNUAL PHYSICAL EXAM: ICD-10-CM

## 2025-06-20 DIAGNOSIS — I10 ESSENTIAL HYPERTENSION: ICD-10-CM

## 2025-06-20 DIAGNOSIS — G36.0 NEUROMYELITIS OPTICA (MULTI): ICD-10-CM

## 2025-06-20 DIAGNOSIS — F41.9 ANXIETY: ICD-10-CM

## 2025-06-20 DIAGNOSIS — F32.0 DEPRESSION, MAJOR, SINGLE EPISODE, MILD: ICD-10-CM

## 2025-06-20 DIAGNOSIS — Z12.31 VISIT FOR SCREENING MAMMOGRAM: ICD-10-CM

## 2025-06-20 DIAGNOSIS — H90.3 ASYMMETRIC SNHL (SENSORINEURAL HEARING LOSS): ICD-10-CM

## 2025-06-20 DIAGNOSIS — E11.9 TYPE 2 DIABETES MELLITUS WITHOUT COMPLICATION, WITHOUT LONG-TERM CURRENT USE OF INSULIN: ICD-10-CM

## 2025-06-20 RX ORDER — ESCITALOPRAM OXALATE 20 MG/1
20 TABLET ORAL DAILY
Qty: 90 TABLET | Refills: 1 | Status: SHIPPED | OUTPATIENT
Start: 2025-06-20 | End: 2025-12-17

## 2025-06-20 RX ORDER — BUPROPION HYDROCHLORIDE 150 MG/1
150 TABLET ORAL EVERY MORNING
Qty: 30 TABLET | Refills: 1 | Status: SHIPPED | OUTPATIENT
Start: 2025-06-20 | End: 2025-08-19

## 2025-06-20 RX ORDER — AMLODIPINE BESYLATE 10 MG/1
10 TABLET ORAL DAILY
Qty: 90 TABLET | Refills: 1 | Status: SHIPPED | OUTPATIENT
Start: 2025-06-20 | End: 2025-12-17

## 2025-06-20 ASSESSMENT — PATIENT HEALTH QUESTIONNAIRE - PHQ9
2. FEELING DOWN, DEPRESSED OR HOPELESS: NOT AT ALL
SUM OF ALL RESPONSES TO PHQ9 QUESTIONS 1 AND 2: 0
1. LITTLE INTEREST OR PLEASURE IN DOING THINGS: NOT AT ALL

## 2025-06-20 ASSESSMENT — ACTIVITIES OF DAILY LIVING (ADL)
BATHING: INDEPENDENT
GROCERY_SHOPPING: INDEPENDENT
DRESSING: INDEPENDENT
MANAGING_FINANCES: INDEPENDENT
TAKING_MEDICATION: INDEPENDENT
DOING_HOUSEWORK: INDEPENDENT

## 2025-06-20 ASSESSMENT — ENCOUNTER SYMPTOMS
DEPRESSION: 0
OCCASIONAL FEELINGS OF UNSTEADINESS: 0
LOSS OF SENSATION IN FEET: 0

## 2025-06-20 NOTE — PROGRESS NOTES
Chief Complaint  Reason for Visit: Celeste Neely is an 64 y.o. female here for a New Patient Visit and Medicare Annual Wellness Visit Subsequent.     Past Medical, Surgical, and Family History reviewed and updated in chart.    Reviewed all medications by prescribing practitioner or clinical pharmacist (such as prescriptions, OTCs, herbal therapies and supplements) and documented in the medical record.    History of Present Illness  1.  Medicare wellness/physical exam.  Pap: hysterectomy 2014  Mammogram: 2023  Colonoscopy: 2024, five year clearance  Immunizations: tdap 2015; eligible for Prevnar and Shingrix- declined    2. HTN  - amlodipine 10mg, doing well on current dosage, denies side effects  - BP in office 124/70  - home BP measurements usually between 110-120 systolic and 70-80 diastolic    3. Depression/Anxiety  - lexapro 20mg  - wellbutrin 75mg   She reports poor control of symptoms on the current dosage. Feeling generalized anxiety and anhedonia. No suicidal thoughts or ideation. Would like to increase dose of medication. Declined therapy referral.     4. T2DM  - mounjaro 5mg  - A1c 3/2025 6.3%  - managed by endocrinology     5. Sensorineural Hearing Loss  - hearing aid L ear      6. 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 Would like ophthalmology referral.     Review of Systems  All pertinent positive symptoms are included in the history of present illness.    All other systems have been reviewed and are negative and noncontributory to this patient's current ailments.    Past Medical History  She has a past medical history of Anxiety disorder, unspecified (06/12/2014), Neuromyelitis optica (devic) (01/06/2020), Personal history of other diseases of the circulatory system, and Type 2 diabetes mellitus without complications (10/09/2022).    Surgical  History  She has a past surgical history that includes Hysterectomy (06/12/2014).     Social History  She reports that she has never smoked. She has never been exposed to tobacco smoke. She has never used smokeless tobacco. She reports that she does not currently use alcohol. She reports that she does not currently use drugs.    Family History[1]  Medications Prior to Visit[2]    Allergies  Penicillins    Immunization History   Administered Date(s) Administered    COVID-19, mRNA, LNP-S, PF, 30 mcg/0.3 mL dose 07/07/2021, 07/28/2021, 01/24/2022    Flu vaccine (IIV4), preservative free *Check age/dose* 11/14/2016, 09/29/2017, 10/26/2018    Flu vaccine, trivalent, preservative free, age 6 months and greater (Fluarix/Fluzone/Flulaval) 09/28/2015    Influenza, Unspecified 09/28/2015    Influenza, seasonal, injectable 10/30/2020    PPD Test 12/10/2014, 03/17/2015, 11/14/2016    Pfizer Gray Cap SARS-CoV-2 05/24/2022    Tdap vaccine, age 7 year and older (BOOSTRIX, ADACEL) 05/30/2016     Objective   Visit Vitals  /70 (BP Location: Left arm, Patient Position: Sitting, BP Cuff Size: Adult)   Pulse 78   Wt 76.5 kg (168 lb 9.6 oz)   SpO2 97%   BMI 27.21 kg/m²   OB Status Hysterectomy   Smoking Status Never   BSA 1.89 m²        BP Readings from Last 3 Encounters:   06/20/25 124/70   06/12/25 118/75   04/27/25 150/80      Wt Readings from Last 3 Encounters:   06/20/25 76.5 kg (168 lb 9.6 oz)   06/12/25 77.1 kg (170 lb)   03/10/25 78.7 kg (173 lb 9.6 oz)      Vision  No results found.    Medicare Wellness Information  Patient Self Assessment of Health Status  Patient Self Assessment: Good    Nutrition and Exercise  Current Diet: Heart Healthy Diet  Adequate Fluid Intake: Yes  Caffeine: No  Exercise Frequency: No Exercise    Functional Ability/Level of Safety  Cognitive Impairment Observed: No cognitive impairment observed    Home Safety Risk Factors: None    Relevant Results  No visits with results within 1 Month(s) from  this visit.   Latest known visit with results is:   Orders Only on 03/06/2025   Component Date Value    CHOLESTEROL, TOTAL 03/06/2025 154     HDL CHOLESTEROL 03/06/2025 45 (L)     TRIGLYCERIDES 03/06/2025 55     LDL-CHOLESTEROL 03/06/2025 95     CHOL/HDLC RATIO 03/06/2025 3.4     NON HDL CHOLESTEROL 03/06/2025 109     GLUCOSE 03/06/2025 97     UREA NITROGEN (BUN) 03/06/2025 9     CREATININE 03/06/2025 0.54     EGFR 03/06/2025 103     SODIUM 03/06/2025 138     POTASSIUM 03/06/2025 4.0     CHLORIDE 03/06/2025 101     CARBON DIOXIDE 03/06/2025 28     ELECTROLYTE BALANCE 03/06/2025 9     CALCIUM 03/06/2025 9.2     PROTEIN, TOTAL 03/06/2025 6.9     ALBUMIN 03/06/2025 4.3     BILIRUBIN, TOTAL 03/06/2025 0.9     ALKALINE PHOSPHATASE 03/06/2025 83     AST 03/06/2025 20     ALT 03/06/2025 24     CREATININE, RANDOM URINE 03/06/2025 107     ALBUMIN, URINE 03/06/2025 4.8     ALBUMIN/CREATININE RATIO* 03/06/2025 45 (H)     TSH W/REFLEX TO FT4 03/06/2025 1.24     HEMOGLOBIN A1c 03/06/2025 6.3 (H)     eAG (mg/dL) 03/06/2025 134     eAG (mmol/L) 03/06/2025 7.4      The 10-year ASCVD risk score (Cheri DK, et al., 2019) is: 15.3%    Values used to calculate the score:      Age: 64 years      Sex: Female      Is Non- : Yes      Diabetic: Yes      Tobacco smoker: No      Systolic Blood Pressure: 124 mmHg      Is BP treated: Yes      HDL Cholesterol: 45 mg/dL      Total Cholesterol: 154 mg/dL    Physical Exam  CONSTITUTIONAL - well nourished, well developed, looks like stated age, in no acute distress, not ill-appearing, and not tired appearing  SKIN - normal skin color and pigmentation, normal skin turgor without rash, lesions, or nodules visualized  HEAD - no trauma, normocephalic  EYES - pupils are equal and reactive to light, extraocular muscles are intact, and normal external exam  ENT - TM's intact, no injection, no signs of infection, uvula midline, normal tongue movement and throat normal, no exudate,  nasal passage without discharge and patent  NECK - supple without rigidity, no neck mass was observed, no thyromegaly or thyroid nodules  CHEST - clear to auscultation, no wheezing, no crackles and no rales, good effort  CARDIAC - regular rate and regular rhythm, no skipped beats, no murmur  ABDOMEN - no organomegaly, soft, nontender, nondistended, normal bowel sounds, no guarding/rebound/rigidity, negative McBurney sign and negative Villatoro sign  RECTAL - prostate is smooth, not enlarged, nontender, no masses or nodules palpable; normal sphincter tone, no rectal masses  EXTREMITIES - no obvious or evident edema, no obvious or evident deformities  NEUROLOGICAL - normal gait, normal balance, normal motor, no ataxia, DTRs equal and symmetrical; alert, oriented and no focal signs  PSYCHIATRIC - alert, pleasant and cordial, age-appropriate  IMMUNOLOGIC - no cervical lymphadenopathy    Assessment and Plan  Problem List Items Addressed This Visit       Anxiety  Continue escitalopram 20mg daily    Asymmetric SNHL (sensorineural hearing loss)  Stable, continue following with neuro    Depression, major, single episode, mild  Uncontrolled, increase bupropion at this time to XL 150mg daily. Reach out in two weeks about the effectiveness. Consider switching to Pristiq if not improved    Relevant Medications    buPROPion XL (Wellbutrin XL) 150 mg 24 hr tablet    escitalopram (Lexapro) 20 mg tablet    Essential hypertension  Restart amlodipine. Can cut dose in half based on home numbers.    Relevant Medications    amLODIPine (Norvasc) 10 mg tablet    Neuromyelitis optica (Multi)    Relevant Orders    Referral to Ophthalmology    Type 2 diabetes mellitus without complication, without long-term current use of insulin  Controlled, continue seeing endocrinology    Medicare annual wellness visit, subsequent - Primary  Declines vaccines  All other preventative screenings are up to date    Annual physical exam  A complete history and  physical was performed     Other Visit Diagnoses         Visit for screening mammogram        Relevant Orders    BI mammo bilateral screening tomosynthesis     Return in 6 months and sooner as needed     Patient Care Team:  Jaimee Lieberman DO as PCP - General (Family Medicine)  Katlin Calloway Pla, DO as PCP - Tash Medicare Advantage PCP  Katlin Calloway Pla, DO as PCP - Nikolas Medicare Advantage PCP         [1]   Family History  Problem Relation Name Age of Onset    Colon cancer Father     [2]   Outpatient Medications Prior to Visit   Medication Sig Dispense Refill    azaTHIOprine (Imuran) 50 mg tablet Take 4 tablets (200 mg) by mouth once daily. 360 tablet 0    Blood glucose monitoring meter kit kit 1 each if needed.      Blood glucose monitoring meter kit kit 1 each once daily. 1 each 0    blood sugar diagnostic (OneTouch Ultra Test) strip 1 each once daily. 100 strip 3    multivitamin-Ca-iron-minerals (Tab-A-Paul Womens) 27-0.4 mg tablet 1 tablet once daily.      tirzepatide (Mounjaro) 5 mg/0.5 mL pen injector Inject 5 mg under the skin every 7 days.      amLODIPine (Norvasc) 10 mg tablet Take 1 tablet (10 mg) by mouth once daily. 30 tablet 1    buPROPion (Wellbutrin) 75 mg tablet Take 1 tablet (75 mg) by mouth once daily. 30 tablet 1    escitalopram (Lexapro) 20 mg tablet Take 1 tablet (20 mg) by mouth once daily. 90 tablet 1    escitalopram (Lexapro) 20 mg tablet Take 1 tablet (20 mg) by mouth once daily. 30 tablet 1     No facility-administered medications prior to visit.

## 2025-07-17 ENCOUNTER — TELEPHONE (OUTPATIENT)
Dept: ORTHOPEDIC SURGERY | Facility: CLINIC | Age: 65
End: 2025-07-17
Payer: MEDICARE

## 2025-07-17 NOTE — TELEPHONE ENCOUNTER
Left a message for Celeste to complete her xray's prior to her appointment. [facility] is unable to do any lower extremity xray's at this time.

## 2025-07-18 ENCOUNTER — HOSPITAL ENCOUNTER (OUTPATIENT)
Dept: RADIOLOGY | Facility: CLINIC | Age: 65
Discharge: HOME | End: 2025-07-18
Payer: MEDICARE

## 2025-07-18 DIAGNOSIS — M25.562 ACUTE PAIN OF BOTH KNEES: ICD-10-CM

## 2025-07-18 DIAGNOSIS — M25.561 ACUTE PAIN OF BOTH KNEES: ICD-10-CM

## 2025-07-18 PROCEDURE — 73564 X-RAY EXAM KNEE 4 OR MORE: CPT | Mod: 50

## 2025-07-21 ENCOUNTER — APPOINTMENT (OUTPATIENT)
Facility: CLINIC | Age: 65
End: 2025-07-21
Payer: MEDICARE

## 2025-07-21 VITALS — HEIGHT: 66 IN | BODY MASS INDEX: 27 KG/M2 | WEIGHT: 168 LBS

## 2025-07-21 DIAGNOSIS — M25.561 PAIN OF RIGHT PATELLOFEMORAL JOINT: ICD-10-CM

## 2025-07-21 DIAGNOSIS — M25.561 ACUTE PAIN OF BOTH KNEES: ICD-10-CM

## 2025-07-21 DIAGNOSIS — M94.262 CHONDROMALACIA, KNEE, LEFT: ICD-10-CM

## 2025-07-21 DIAGNOSIS — M94.261 CHONDROMALACIA, KNEE, RIGHT: ICD-10-CM

## 2025-07-21 DIAGNOSIS — M25.562 PAIN OF LEFT PATELLOFEMORAL JOINT: ICD-10-CM

## 2025-07-21 DIAGNOSIS — M17.11 ARTHRITIS OF RIGHT KNEE: ICD-10-CM

## 2025-07-21 DIAGNOSIS — M17.12 ARTHRITIS OF LEFT KNEE: Primary | ICD-10-CM

## 2025-07-21 DIAGNOSIS — M25.562 ACUTE PAIN OF BOTH KNEES: ICD-10-CM

## 2025-07-21 PROCEDURE — 3008F BODY MASS INDEX DOCD: CPT | Performed by: ORTHOPAEDIC SURGERY

## 2025-07-21 PROCEDURE — 20610 DRAIN/INJ JOINT/BURSA W/O US: CPT | Performed by: ORTHOPAEDIC SURGERY

## 2025-07-21 PROCEDURE — 99204 OFFICE O/P NEW MOD 45 MIN: CPT | Performed by: ORTHOPAEDIC SURGERY

## 2025-07-21 PROCEDURE — 1036F TOBACCO NON-USER: CPT | Performed by: ORTHOPAEDIC SURGERY

## 2025-07-21 RX ORDER — NAPROXEN 500 MG/1
500 TABLET ORAL 2 TIMES DAILY PRN
Qty: 60 TABLET | Refills: 0 | Status: SHIPPED | OUTPATIENT
Start: 2025-07-21 | End: 2025-08-20

## 2025-07-21 RX ORDER — LIDOCAINE HYDROCHLORIDE 5 MG/ML
4 INJECTION, SOLUTION INFILTRATION; PERINEURAL
Status: COMPLETED | OUTPATIENT
Start: 2025-07-21 | End: 2025-07-21

## 2025-07-21 RX ORDER — TRIAMCINOLONE ACETONIDE 40 MG/ML
40 INJECTION, SUSPENSION INTRA-ARTICULAR; INTRAMUSCULAR
Status: COMPLETED | OUTPATIENT
Start: 2025-07-21 | End: 2025-07-21

## 2025-07-21 RX ADMIN — LIDOCAINE HYDROCHLORIDE 4 ML: 5 INJECTION, SOLUTION INFILTRATION; PERINEURAL at 12:03

## 2025-07-21 RX ADMIN — TRIAMCINOLONE ACETONIDE 40 MG: 40 INJECTION, SUSPENSION INTRA-ARTICULAR; INTRAMUSCULAR at 12:03

## 2025-07-21 ASSESSMENT — PAIN - FUNCTIONAL ASSESSMENT: PAIN_FUNCTIONAL_ASSESSMENT: 0-10

## 2025-07-21 ASSESSMENT — PAIN SCALES - GENERAL: PAINLEVEL_OUTOF10: 6

## 2025-07-21 NOTE — PATIENT INSTRUCTIONS
BMI was above normal measurement. Current weight: 76.2 kg (168 lb)  Weight change since last visit (-) denotes wt loss -0.6 lbs   Weight loss needed to achieve BMI 25: 13.4 Lbs  Weight loss needed to achieve BMI 30: -17.5 Lbs  Advised to Increase physical activity.

## 2025-07-21 NOTE — PROGRESS NOTES
64-year-old female with 1 week of bilateral anterior knee pain.  Patient stated she fell on concrete injuring the anterior aspects of her knees.  Since then she has had pain when she is trying to squat down or kneel when she is gardening and pain when rising from a seated position.  Pain has been mildly improving on its own.  She has been trying to treat it with ice Tylenol and naproxen    Patients' self reported past medical history, medications, allergies, surgical history, family and social history as well as a 10 point review of systems has been documented in the new patient intake form and scanned into the patient's electronic medical record.  The intake form was reviewed by Dr Ribera during the office visit and signed by Dr. Ribera and the patient.  Pertinent findings are documented in the HPI.    General Multi-System Physical Exam:  Constitutional  General appearance:  Alert, oriented, and in no acute distress.  Well developed, well nourished.  Head and Face  Head and face:  Normocephalic and atraumatic.  Ears, Nose, Mouth, and Throat  External inspection of ears and nose: Normal.  Eyes:  Pupils are equal and round.  Neck  Neck:  no neck mass was observed.  Pulmonary  Respiratory effort:  no respiratory distress.  Cardiovascular  Intact distal pulses.  Lymphatic  Palpation of lymph nodes in the affected extremity:  Normal.  Skin  Skin and subcutaneous tissue:  Normal skin color and pigmentation.  Normal skin turgor.  No rashes.  Neurologic  Sensation:  normal to light touch.  Psychiatric  Judgement and insight:  Intact.  Mood and affect:  Normal.  Musculoskeletal  Bilateral knees have full range of motion and no effusion.  She has minimal patellar grind negative patellar apprehension mild medial joint line tenderness bilaterally.  No lateral joint line tenderness bilaterally.  Patient has a negative Lockman exam, negative anterior and negative posterior drawer. The knee is stable to varus and valgus stress  "without pain. Patient is neurovascularly intact in the bilateral lower extremities.    X-rays of the patient were ordered by Dr Ribera and obtained today.  Dr Ribera personally reviewed the results of the x-rays.    In addition, Dr Ribera independently interpreted the patient's x-rays (performed by the Radiology department) by viewing the x-ray images and this is Dr. Ribera's personal interpretation:     X-rays of bilateral knees show mild arthritis worse in the patellofemoral joints    We had a discussion in regards to the patient's knee pain.  We discussed knee arthritis.      Nonoperative and operative therapies for knee arthritis include weight loss, physical therapy, anti-inflammatory medications, steroid injections, viscosupplementation injections, bracing, walking aids such as a cane, knee arthroscopy and total knee replacement.  Knee arthritis is a progressive disease and while we cannot reverse the stages of arthritis, we hope to make the patient more comfortable.       The patient's height and weight were documented today in the vitals tab in the patient's EPIC chart, and the patient's BMI was calculated.  A follow up plan was then developed by Dr. Ribera, per  mandated guidelines, based upon the patient's BMI and the follow up plan was documented in the \"Patient Instructions\" section of the chart.  Weight loss can be achieved by decreasing the amount of calories consumed daily and by increasing daily physical activity.  We recommend finding physical activities that you can participate in regularly and you enjoy which do not worsen your current joint pains.     We will start off by treating the patient's knee conservatively (AKA non-operatively).  We gave the patient a prescription for physical therapy to work on increasing the range of motion and strength in the knee.  We also gave the patient a \"home exercise protocol\" list of exercises that they could work on to strengthen their knee at home.  We also " called in a prescription for anti-inflammatories for the patient.  The patient was informed that there are rare risks of using nonsteroidal antiinflammatory (NSAID) medications.  Risks of NSAIDS include, but are not limited to, upset stomach, ulcers in the stomach and other places in the gastrointestinal tract, and a mild increase in cardiovascular risk as a result of the antiinflammatory medications.  In addition, there is an increased risk in bleeding as a result of the medications.  The patient was advised to stop taking the NSAIDs if they cause them to have an upset stomach.  The patient was instructed to take the medication on a p.r.n. basis as needed only.  NSAIDs are not supposed to be taken every day for more than a few weeks.  If they have any questions or problems with the antiinflammatory medications, they should stop taking the medication immediately and call the office.    We offered the patient an injection of steroids into their knee.  I explained to the patient that there are some rare risks of steroid injections.      Rare risks of steroid injections into the knee include pain at the site of the injection, local swelling, irritation from the injection or the skin spray, local discoloration of the skin, risk of bleeding, and a risk of knee infection.  If the patient does have a flareup of pain in the evening following the injection, they should ice the knee 15 minutes at a time 3 times a day for up to 3 days.  If the pain gets too severe, they should go to a local Emergency Room right away.      After understanding that there are risks and benefits of steroid injections, the patient decided to proceed with injection.  The knee was prepped sterilely with betadyne and 5 mL of 0.25% Marcaine and 1 mL of Kenalog 40 mg was injected into the knee without complications.  A bandage was applied at the site of the injection.  The patient tolerated the procedure well.    The patient wanted injection only into  "her right knee today    Patient ID: Celeste Neely is a 64 y.o. female.    L Inj/Asp: R knee on 7/21/2025 12:03 PM  Indications: pain  Details: 22 G needle, anterolateral approach  Medications: 40 mg triamcinolone acetonide 40 mg/mL; 4 mL lidocaine 5 mg/mL (0.5 %)  Outcome: tolerated well, no immediate complications  Procedure, treatment alternatives, risks and benefits explained, specific risks discussed. Consent was given by the patient. Immediately prior to procedure a time out was called to verify the correct patient, procedure, equipment, support staff and site/side marked as required. Patient was prepped and draped in the usual sterile fashion.           The patient's height and weight were documented today in the vitals tab in the patient's EPIC chart, and the patient's BMI was calculated.  A follow up plan was then developed by Dr. Ribera, per  mandated guidelines, based upon the patient's BMI and the follow up plan was documented in the \"Patient Instructions\" section of the chart.  Weight loss can be achieved by decreasing the amount of calories consumed daily and by increasing daily physical activity.  We recommend finding physical activities that you can participate in regularly and you enjoy which do not worsen your current joint pains.       This patient has a new, acute, previously-undiagnosed problem of their affected extremity.  We will begin treatment as listed here and monitor treatment based upon their progression and response to treatment.  Due to the fact that we are just beginning treatment on this issue, this is currently considered an undiagnosed new problem with uncertain prognosis.  The exact diagnosis and their prognosis will depend upon their response to treatment and progression of their condition as time progresses.    Due to this patient's condition, they are at a moderate risk of morbidity from additional diagnostic testing / treatment.      To help them with their pain, I wrote " them a prescription for prescription strength anti-inflammatories.  The patient was informed that there are risks of using nonsteroidal antiinflammatory (NSAID) medications.    Risks of NSAIDS include, but are not limited to, upset stomach, ulcers in the stomach and other places in the gastrointestinal tract, and a mild increase in cardiovascular risk as a result of the antiinflammatory medications.  In addition, there is an increased risk in bleeding as a result of the medications.    The patient was advised to stop taking the NSAIDs if they cause them to have an upset stomach.  NSAIDs are not supposed to be taken every day for more than a few weeks.  If they have any questions or problems with the antiinflammatory medications, they should stop taking the medication immediately and call the office.    This patient's knee pain complaints are coming from their mild to moderate knee arthritis and cartilage damage.  I feel this patient would not benefit from any knee arthroscopy and there is excellent literature that a knee arthroscopy would do nothing to help their current pain.  I am referring this patient to our Orthopaedic Physician Assistants for all further care and knee injections.    When this patient calls our office to schedule another injection, please schedule the patient to see one of our Orthopedic PAs to get all knee further injections.  When the arthritis in their knee worsens, they can then be referred to Dr. Penn to discuss total knee arthroplasty.        This patient should not be scheduled to see me again in regards to their knee pain since there is nothing else I can do to help this patient's knee pain.  Follow-up appointment should be with one of our orthopedic physician assistants.

## 2025-07-29 DIAGNOSIS — F32.0 DEPRESSION, MAJOR, SINGLE EPISODE, MILD: ICD-10-CM

## 2025-07-29 RX ORDER — BUPROPION HYDROCHLORIDE 150 MG/1
150 TABLET ORAL EVERY MORNING
Qty: 90 TABLET | Refills: 1 | OUTPATIENT
Start: 2025-07-29 | End: 2025-09-27

## 2025-08-27 ENCOUNTER — HOSPITAL ENCOUNTER (OUTPATIENT)
Dept: RADIOLOGY | Facility: HOSPITAL | Age: 65
Discharge: HOME | End: 2025-08-27
Payer: MEDICARE

## 2025-08-27 DIAGNOSIS — E04.1 LEFT THYROID NODULE: ICD-10-CM

## 2025-08-27 PROCEDURE — 76536 US EXAM OF HEAD AND NECK: CPT | Performed by: RADIOLOGY

## 2025-08-27 PROCEDURE — 76536 US EXAM OF HEAD AND NECK: CPT

## 2025-09-22 ENCOUNTER — APPOINTMENT (OUTPATIENT)
Dept: ENDOCRINOLOGY | Facility: CLINIC | Age: 65
End: 2025-09-22
Payer: MEDICARE